# Patient Record
Sex: MALE | Race: WHITE | URBAN - METROPOLITAN AREA
[De-identification: names, ages, dates, MRNs, and addresses within clinical notes are randomized per-mention and may not be internally consistent; named-entity substitution may affect disease eponyms.]

---

## 2018-12-23 ENCOUNTER — HOSPITAL ENCOUNTER (INPATIENT)
Facility: CLINIC | Age: 38
LOS: 3 days | Discharge: HOME | DRG: 580 | End: 2018-12-27
Attending: PHYSICIAN ASSISTANT | Admitting: FAMILY MEDICINE
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
## 2018-12-23 DIAGNOSIS — S61.411A STAB WOUND OF RIGHT HAND, INITIAL ENCOUNTER: ICD-10-CM

## 2018-12-23 DIAGNOSIS — L03.114 CELLULITIS OF LEFT UPPER EXTREMITY: ICD-10-CM

## 2018-12-23 DIAGNOSIS — L03.114 CELLULITIS OF LEFT UPPER EXTREMITY: Primary | ICD-10-CM

## 2018-12-23 DIAGNOSIS — L03.113 CELLULITIS OF RIGHT HAND: ICD-10-CM

## 2018-12-23 DIAGNOSIS — R79.82 ELEVATED C-REACTIVE PROTEIN (CRP): ICD-10-CM

## 2018-12-23 PROBLEM — L08.9 SKIN INFECTION: Status: ACTIVE | Noted: 2018-12-23

## 2018-12-23 PROBLEM — F17.200 CURRENT SMOKER: Status: ACTIVE | Noted: 2018-12-23

## 2018-12-23 LAB
ANION GAP SERPL CALCULATED.3IONS-SCNC: 11 MMOL/L (ref 3–14)
BASOPHILS # BLD AUTO: 0 10E9/L (ref 0–0.2)
BASOPHILS NFR BLD AUTO: 0.3 %
BUN SERPL-MCNC: 17 MG/DL (ref 7–30)
CALCIUM SERPL-MCNC: 8.3 MG/DL (ref 8.5–10.1)
CHLORIDE SERPL-SCNC: 103 MMOL/L (ref 94–109)
CO2 SERPL-SCNC: 25 MMOL/L (ref 20–32)
CREAT SERPL-MCNC: 1.18 MG/DL (ref 0.66–1.25)
CRP SERPL-MCNC: 60 MG/L (ref 0–8)
DIFFERENTIAL METHOD BLD: NORMAL
EOSINOPHIL NFR BLD AUTO: 0.6 %
ERYTHROCYTE [DISTWIDTH] IN BLOOD BY AUTOMATED COUNT: 11.9 % (ref 10–15)
GFR SERPL CREATININE-BSD FRML MDRD: 77 ML/MIN/{1.73_M2}
GLUCOSE SERPL-MCNC: 88 MG/DL (ref 70–99)
HCT VFR BLD AUTO: 42.6 % (ref 40–53)
HGB BLD-MCNC: 15 G/DL (ref 13.3–17.7)
IMM GRANULOCYTES # BLD: 0 10E9/L (ref 0–0.4)
IMM GRANULOCYTES NFR BLD: 0.4 %
LACTATE BLD-SCNC: 0.8 MMOL/L (ref 0.7–2)
LYMPHOCYTES # BLD AUTO: 2.1 10E9/L (ref 0.8–5.3)
LYMPHOCYTES NFR BLD AUTO: 21.8 %
MCH RBC QN AUTO: 30.4 PG (ref 26.5–33)
MCHC RBC AUTO-ENTMCNC: 35.2 G/DL (ref 31.5–36.5)
MCV RBC AUTO: 86 FL (ref 78–100)
MONOCYTES # BLD AUTO: 1.1 10E9/L (ref 0–1.3)
MONOCYTES NFR BLD AUTO: 11.6 %
NEUTROPHILS # BLD AUTO: 6.2 10E9/L (ref 1.6–8.3)
NEUTROPHILS NFR BLD AUTO: 65.3 %
NRBC # BLD AUTO: 0 10*3/UL
NRBC BLD AUTO-RTO: 0 /100
PLATELET # BLD AUTO: 217 10E9/L (ref 150–450)
POTASSIUM SERPL-SCNC: 3.9 MMOL/L (ref 3.4–5.3)
RBC # BLD AUTO: 4.93 10E12/L (ref 4.4–5.9)
SODIUM SERPL-SCNC: 139 MMOL/L (ref 133–144)
WBC # BLD AUTO: 9.5 10E9/L (ref 4–11)

## 2018-12-23 PROCEDURE — 96375 TX/PRO/DX INJ NEW DRUG ADDON: CPT | Performed by: FAMILY MEDICINE

## 2018-12-23 PROCEDURE — 99285 EMERGENCY DEPT VISIT HI MDM: CPT | Mod: Z6 | Performed by: FAMILY MEDICINE

## 2018-12-23 PROCEDURE — G0378 HOSPITAL OBSERVATION PER HR: HCPCS

## 2018-12-23 PROCEDURE — 99285 EMERGENCY DEPT VISIT HI MDM: CPT | Mod: 25 | Performed by: FAMILY MEDICINE

## 2018-12-23 PROCEDURE — 90471 IMMUNIZATION ADMIN: CPT | Performed by: FAMILY MEDICINE

## 2018-12-23 PROCEDURE — 87040 BLOOD CULTURE FOR BACTERIA: CPT | Performed by: PHYSICIAN ASSISTANT

## 2018-12-23 PROCEDURE — 25000128 H RX IP 250 OP 636: Performed by: PHYSICIAN ASSISTANT

## 2018-12-23 PROCEDURE — 99219 ZZC INITIAL OBSERVATION CARE,LEVL II: CPT | Mod: AI | Performed by: FAMILY MEDICINE

## 2018-12-23 PROCEDURE — 96365 THER/PROPH/DIAG IV INF INIT: CPT | Performed by: FAMILY MEDICINE

## 2018-12-23 PROCEDURE — 80048 BASIC METABOLIC PNL TOTAL CA: CPT | Performed by: PHYSICIAN ASSISTANT

## 2018-12-23 PROCEDURE — 86140 C-REACTIVE PROTEIN: CPT | Performed by: PHYSICIAN ASSISTANT

## 2018-12-23 PROCEDURE — 90715 TDAP VACCINE 7 YRS/> IM: CPT | Performed by: PHYSICIAN ASSISTANT

## 2018-12-23 PROCEDURE — 83605 ASSAY OF LACTIC ACID: CPT | Performed by: PHYSICIAN ASSISTANT

## 2018-12-23 PROCEDURE — 85025 COMPLETE CBC W/AUTO DIFF WBC: CPT | Performed by: PHYSICIAN ASSISTANT

## 2018-12-23 PROCEDURE — 25000125 ZZHC RX 250: Performed by: FAMILY MEDICINE

## 2018-12-23 PROCEDURE — 25000132 ZZH RX MED GY IP 250 OP 250 PS 637: Performed by: FAMILY MEDICINE

## 2018-12-23 RX ORDER — CEFAZOLIN SODIUM 1 G/50ML
1250 SOLUTION INTRAVENOUS EVERY 8 HOURS
Status: DISCONTINUED | OUTPATIENT
Start: 2018-12-23 | End: 2018-12-24

## 2018-12-23 RX ORDER — LIDOCAINE 40 MG/G
CREAM TOPICAL
Status: DISCONTINUED | OUTPATIENT
Start: 2018-12-23 | End: 2018-12-24

## 2018-12-23 RX ORDER — ERTAPENEM 1 G/1
1 INJECTION, POWDER, LYOPHILIZED, FOR SOLUTION INTRAMUSCULAR; INTRAVENOUS EVERY 24 HOURS
Status: DISCONTINUED | OUTPATIENT
Start: 2018-12-24 | End: 2018-12-24

## 2018-12-23 RX ORDER — ACETAMINOPHEN 650 MG/1
650 SUPPOSITORY RECTAL EVERY 4 HOURS PRN
Status: DISCONTINUED | OUTPATIENT
Start: 2018-12-23 | End: 2018-12-24

## 2018-12-23 RX ORDER — ERTAPENEM 1 G/1
1 INJECTION, POWDER, LYOPHILIZED, FOR SOLUTION INTRAMUSCULAR; INTRAVENOUS ONCE
Status: COMPLETED | OUTPATIENT
Start: 2018-12-23 | End: 2018-12-24

## 2018-12-23 RX ORDER — KETOROLAC TROMETHAMINE 30 MG/ML
30 INJECTION, SOLUTION INTRAMUSCULAR; INTRAVENOUS ONCE
Status: COMPLETED | OUTPATIENT
Start: 2018-12-23 | End: 2018-12-23

## 2018-12-23 RX ORDER — NALOXONE HYDROCHLORIDE 0.4 MG/ML
.1-.4 INJECTION, SOLUTION INTRAMUSCULAR; INTRAVENOUS; SUBCUTANEOUS
Status: DISCONTINUED | OUTPATIENT
Start: 2018-12-23 | End: 2018-12-24

## 2018-12-23 RX ORDER — ONDANSETRON 2 MG/ML
4 INJECTION INTRAMUSCULAR; INTRAVENOUS EVERY 6 HOURS PRN
Status: DISCONTINUED | OUTPATIENT
Start: 2018-12-23 | End: 2018-12-27 | Stop reason: HOSPADM

## 2018-12-23 RX ORDER — ACETAMINOPHEN 325 MG/1
650 TABLET ORAL EVERY 4 HOURS PRN
Status: DISCONTINUED | OUTPATIENT
Start: 2018-12-23 | End: 2018-12-24

## 2018-12-23 RX ORDER — OXYCODONE HYDROCHLORIDE 5 MG/1
5-10 TABLET ORAL EVERY 4 HOURS PRN
Status: DISCONTINUED | OUTPATIENT
Start: 2018-12-23 | End: 2018-12-24

## 2018-12-23 RX ORDER — ONDANSETRON 4 MG/1
4 TABLET, ORALLY DISINTEGRATING ORAL EVERY 6 HOURS PRN
Status: DISCONTINUED | OUTPATIENT
Start: 2018-12-23 | End: 2018-12-27 | Stop reason: HOSPADM

## 2018-12-23 RX ORDER — BACITRACIN ZINC 500 [USP'U]/G
OINTMENT TOPICAL DAILY
Status: DISCONTINUED | OUTPATIENT
Start: 2018-12-23 | End: 2018-12-24

## 2018-12-23 RX ADMIN — KETOROLAC TROMETHAMINE 30 MG: 30 INJECTION, SOLUTION INTRAMUSCULAR at 20:27

## 2018-12-23 RX ADMIN — VANCOMYCIN HYDROCHLORIDE 1250 MG: 1 INJECTION, POWDER, LYOPHILIZED, FOR SOLUTION INTRAVENOUS at 22:12

## 2018-12-23 RX ADMIN — CLOSTRIDIUM TETANI TOXOID ANTIGEN (FORMALDEHYDE INACTIVATED), CORYNEBACTERIUM DIPHTHERIAE TOXOID ANTIGEN (FORMALDEHYDE INACTIVATED), BORDETELLA PERTUSSIS TOXOID ANTIGEN (GLUTARALDEHYDE INACTIVATED), BORDETELLA PERTUSSIS FILAMENTOUS HEMAGGLUTININ ANTIGEN (FORMALDEHYDE INACTIVATED), BORDETELLA PERTUSSIS PERTACTIN ANTIGEN, AND BORDETELLA PERTUSSIS FIMBRIAE 2/3 ANTIGEN 0.5 ML: 5; 2; 2.5; 5; 3; 5 INJECTION, SUSPENSION INTRAMUSCULAR at 22:17

## 2018-12-23 RX ADMIN — OXYCODONE HYDROCHLORIDE 10 MG: 5 TABLET ORAL at 23:34

## 2018-12-23 RX ADMIN — BACITRACIN: 500 OINTMENT TOPICAL at 23:42

## 2018-12-23 ASSESSMENT — MIFFLIN-ST. JEOR
SCORE: 1974.25
SCORE: 1878.8

## 2018-12-24 ENCOUNTER — ANESTHESIA EVENT (OUTPATIENT)
Dept: SURGERY | Facility: CLINIC | Age: 38
DRG: 580 | End: 2018-12-24

## 2018-12-24 ENCOUNTER — ANESTHESIA (OUTPATIENT)
Dept: SURGERY | Facility: CLINIC | Age: 38
DRG: 580 | End: 2018-12-24

## 2018-12-24 ENCOUNTER — APPOINTMENT (OUTPATIENT)
Dept: GENERAL RADIOLOGY | Facility: CLINIC | Age: 38
DRG: 580 | End: 2018-12-24
Attending: ORTHOPAEDIC SURGERY

## 2018-12-24 PROBLEM — M00.9: Status: ACTIVE | Noted: 2018-12-24

## 2018-12-24 LAB
ANION GAP SERPL CALCULATED.3IONS-SCNC: 9 MMOL/L (ref 3–14)
BUN SERPL-MCNC: 17 MG/DL (ref 7–30)
CALCIUM SERPL-MCNC: 7.9 MG/DL (ref 8.5–10.1)
CHLORIDE SERPL-SCNC: 105 MMOL/L (ref 94–109)
CO2 SERPL-SCNC: 24 MMOL/L (ref 20–32)
CREAT SERPL-MCNC: 1.19 MG/DL (ref 0.66–1.25)
GFR SERPL CREATININE-BSD FRML MDRD: 77 ML/MIN/{1.73_M2}
GLUCOSE SERPL-MCNC: 96 MG/DL (ref 70–99)
GRAM STN SPEC: ABNORMAL
Lab: ABNORMAL
POTASSIUM SERPL-SCNC: 3.6 MMOL/L (ref 3.4–5.3)
SODIUM SERPL-SCNC: 138 MMOL/L (ref 133–144)
SPECIMEN SOURCE: ABNORMAL

## 2018-12-24 PROCEDURE — 87075 CULTR BACTERIA EXCEPT BLOOD: CPT | Performed by: ORTHOPAEDIC SURGERY

## 2018-12-24 PROCEDURE — 96375 TX/PRO/DX INJ NEW DRUG ADDON: CPT

## 2018-12-24 PROCEDURE — 87070 CULTURE OTHR SPECIMN AEROBIC: CPT | Performed by: ORTHOPAEDIC SURGERY

## 2018-12-24 PROCEDURE — 73130 X-RAY EXAM OF HAND: CPT | Mod: TC,RT

## 2018-12-24 PROCEDURE — 25000128 H RX IP 250 OP 636: Performed by: NURSE ANESTHETIST, CERTIFIED REGISTERED

## 2018-12-24 PROCEDURE — 0JBJ0ZZ EXCISION OF RIGHT HAND SUBCUTANEOUS TISSUE AND FASCIA, OPEN APPROACH: ICD-10-PCS | Performed by: ORTHOPAEDIC SURGERY

## 2018-12-24 PROCEDURE — 25000132 ZZH RX MED GY IP 250 OP 250 PS 637: Performed by: ORTHOPAEDIC SURGERY

## 2018-12-24 PROCEDURE — 12000000 ZZH R&B MED SURG/OB

## 2018-12-24 PROCEDURE — 25000128 H RX IP 250 OP 636: Performed by: FAMILY MEDICINE

## 2018-12-24 PROCEDURE — 87205 SMEAR GRAM STAIN: CPT | Performed by: ORTHOPAEDIC SURGERY

## 2018-12-24 PROCEDURE — 25000128 H RX IP 250 OP 636: Performed by: ORTHOPAEDIC SURGERY

## 2018-12-24 PROCEDURE — 87077 CULTURE AEROBIC IDENTIFY: CPT | Performed by: ORTHOPAEDIC SURGERY

## 2018-12-24 PROCEDURE — 36415 COLL VENOUS BLD VENIPUNCTURE: CPT | Performed by: FAMILY MEDICINE

## 2018-12-24 PROCEDURE — 27210794 ZZH OR GENERAL SUPPLY STERILE: Performed by: ORTHOPAEDIC SURGERY

## 2018-12-24 PROCEDURE — 96376 TX/PRO/DX INJ SAME DRUG ADON: CPT

## 2018-12-24 PROCEDURE — 25000125 ZZHC RX 250: Performed by: NURSE ANESTHETIST, CERTIFIED REGISTERED

## 2018-12-24 PROCEDURE — 87076 CULTURE ANAEROBE IDENT EACH: CPT | Performed by: ORTHOPAEDIC SURGERY

## 2018-12-24 PROCEDURE — 36000058 ZZH SURGERY LEVEL 3 EA 15 ADDTL MIN: Performed by: ORTHOPAEDIC SURGERY

## 2018-12-24 PROCEDURE — 37000009 ZZH ANESTHESIA TECHNICAL FEE, EACH ADDTL 15 MIN: Performed by: ORTHOPAEDIC SURGERY

## 2018-12-24 PROCEDURE — 87186 SC STD MICRODIL/AGAR DIL: CPT | Performed by: ORTHOPAEDIC SURGERY

## 2018-12-24 PROCEDURE — 40000306 ZZH STATISTIC PRE PROC ASSESS II: Performed by: ORTHOPAEDIC SURGERY

## 2018-12-24 PROCEDURE — 26075 EXPLORE/TREAT FINGER JOINT: CPT | Mod: F9 | Performed by: ORTHOPAEDIC SURGERY

## 2018-12-24 PROCEDURE — 71000014 ZZH RECOVERY PHASE 1 LEVEL 2 FIRST HR: Performed by: ORTHOPAEDIC SURGERY

## 2018-12-24 PROCEDURE — 99207 ZZC NON-BILLABLE SERV PER CHARTING: CPT | Performed by: FAMILY MEDICINE

## 2018-12-24 PROCEDURE — 25000128 H RX IP 250 OP 636: Performed by: PHYSICIAN ASSISTANT

## 2018-12-24 PROCEDURE — 25000132 ZZH RX MED GY IP 250 OP 250 PS 637: Performed by: FAMILY MEDICINE

## 2018-12-24 PROCEDURE — G0378 HOSPITAL OBSERVATION PER HR: HCPCS

## 2018-12-24 PROCEDURE — 80048 BASIC METABOLIC PNL TOTAL CA: CPT | Performed by: FAMILY MEDICINE

## 2018-12-24 PROCEDURE — 37000008 ZZH ANESTHESIA TECHNICAL FEE, 1ST 30 MIN: Performed by: ORTHOPAEDIC SURGERY

## 2018-12-24 PROCEDURE — 87185 SC STD ENZYME DETCJ PER NZM: CPT | Performed by: ORTHOPAEDIC SURGERY

## 2018-12-24 PROCEDURE — 25000566 ZZH SEVOFLURANE, EA 15 MIN: Performed by: ORTHOPAEDIC SURGERY

## 2018-12-24 PROCEDURE — 36000056 ZZH SURGERY LEVEL 3 1ST 30 MIN: Performed by: ORTHOPAEDIC SURGERY

## 2018-12-24 RX ORDER — COVID-19 ANTIGEN TEST
440 KIT MISCELLANEOUS PRN
COMMUNITY

## 2018-12-24 RX ORDER — FENTANYL CITRATE 50 UG/ML
25-50 INJECTION, SOLUTION INTRAMUSCULAR; INTRAVENOUS
Status: DISCONTINUED | OUTPATIENT
Start: 2018-12-24 | End: 2018-12-25

## 2018-12-24 RX ORDER — LIDOCAINE 40 MG/G
CREAM TOPICAL
Status: DISCONTINUED | OUTPATIENT
Start: 2018-12-24 | End: 2018-12-27 | Stop reason: HOSPADM

## 2018-12-24 RX ORDER — OXYCODONE HYDROCHLORIDE 5 MG/1
5-10 TABLET ORAL
Status: DISCONTINUED | OUTPATIENT
Start: 2018-12-24 | End: 2018-12-27 | Stop reason: HOSPADM

## 2018-12-24 RX ORDER — HYDROMORPHONE HYDROCHLORIDE 1 MG/ML
.3-.5 INJECTION, SOLUTION INTRAMUSCULAR; INTRAVENOUS; SUBCUTANEOUS EVERY 10 MIN PRN
Status: DISCONTINUED | OUTPATIENT
Start: 2018-12-24 | End: 2018-12-24 | Stop reason: HOSPADM

## 2018-12-24 RX ORDER — DIMENHYDRINATE 50 MG/ML
25 INJECTION, SOLUTION INTRAMUSCULAR; INTRAVENOUS
Status: DISCONTINUED | OUTPATIENT
Start: 2018-12-24 | End: 2018-12-24 | Stop reason: HOSPADM

## 2018-12-24 RX ORDER — PROPOFOL 10 MG/ML
INJECTION, EMULSION INTRAVENOUS PRN
Status: DISCONTINUED | OUTPATIENT
Start: 2018-12-24 | End: 2018-12-24

## 2018-12-24 RX ORDER — FENTANYL CITRATE 50 UG/ML
INJECTION, SOLUTION INTRAMUSCULAR; INTRAVENOUS PRN
Status: DISCONTINUED | OUTPATIENT
Start: 2018-12-24 | End: 2018-12-24

## 2018-12-24 RX ORDER — SODIUM CHLORIDE, SODIUM LACTATE, POTASSIUM CHLORIDE, CALCIUM CHLORIDE 600; 310; 30; 20 MG/100ML; MG/100ML; MG/100ML; MG/100ML
INJECTION, SOLUTION INTRAVENOUS CONTINUOUS
Status: DISCONTINUED | OUTPATIENT
Start: 2018-12-24 | End: 2018-12-24 | Stop reason: HOSPADM

## 2018-12-24 RX ORDER — FENTANYL CITRATE 50 UG/ML
25-50 INJECTION, SOLUTION INTRAMUSCULAR; INTRAVENOUS
Status: DISCONTINUED | OUTPATIENT
Start: 2018-12-24 | End: 2018-12-24 | Stop reason: HOSPADM

## 2018-12-24 RX ORDER — NALOXONE HYDROCHLORIDE 0.4 MG/ML
.1-.4 INJECTION, SOLUTION INTRAMUSCULAR; INTRAVENOUS; SUBCUTANEOUS
Status: DISCONTINUED | OUTPATIENT
Start: 2018-12-24 | End: 2018-12-27 | Stop reason: HOSPADM

## 2018-12-24 RX ORDER — AMPICILLIN AND SULBACTAM 2; 1 G/1; G/1
3 INJECTION, POWDER, FOR SOLUTION INTRAMUSCULAR; INTRAVENOUS EVERY 6 HOURS
Status: DISCONTINUED | OUTPATIENT
Start: 2018-12-24 | End: 2018-12-27 | Stop reason: HOSPADM

## 2018-12-24 RX ORDER — ACETAMINOPHEN 325 MG/1
975 TABLET ORAL EVERY 8 HOURS
Status: COMPLETED | OUTPATIENT
Start: 2018-12-24 | End: 2018-12-27

## 2018-12-24 RX ORDER — ONDANSETRON 2 MG/ML
4 INJECTION INTRAMUSCULAR; INTRAVENOUS EVERY 30 MIN PRN
Status: DISCONTINUED | OUTPATIENT
Start: 2018-12-24 | End: 2018-12-24 | Stop reason: HOSPADM

## 2018-12-24 RX ORDER — MEPERIDINE HYDROCHLORIDE 25 MG/ML
12.5 INJECTION INTRAMUSCULAR; INTRAVENOUS; SUBCUTANEOUS
Status: DISCONTINUED | OUTPATIENT
Start: 2018-12-24 | End: 2018-12-24 | Stop reason: HOSPADM

## 2018-12-24 RX ORDER — SODIUM CHLORIDE, SODIUM LACTATE, POTASSIUM CHLORIDE, CALCIUM CHLORIDE 600; 310; 30; 20 MG/100ML; MG/100ML; MG/100ML; MG/100ML
INJECTION, SOLUTION INTRAVENOUS CONTINUOUS
Status: DISCONTINUED | OUTPATIENT
Start: 2018-12-24 | End: 2018-12-27 | Stop reason: HOSPADM

## 2018-12-24 RX ORDER — LIDOCAINE HYDROCHLORIDE 20 MG/ML
INJECTION, SOLUTION INFILTRATION; PERINEURAL PRN
Status: DISCONTINUED | OUTPATIENT
Start: 2018-12-24 | End: 2018-12-24

## 2018-12-24 RX ORDER — LIDOCAINE 40 MG/G
CREAM TOPICAL
Status: DISCONTINUED | OUTPATIENT
Start: 2018-12-24 | End: 2018-12-24 | Stop reason: HOSPADM

## 2018-12-24 RX ORDER — ONDANSETRON 4 MG/1
4 TABLET, ORALLY DISINTEGRATING ORAL EVERY 6 HOURS PRN
Status: DISCONTINUED | OUTPATIENT
Start: 2018-12-24 | End: 2018-12-24

## 2018-12-24 RX ORDER — ONDANSETRON 2 MG/ML
4 INJECTION INTRAMUSCULAR; INTRAVENOUS EVERY 6 HOURS PRN
Status: DISCONTINUED | OUTPATIENT
Start: 2018-12-24 | End: 2018-12-24

## 2018-12-24 RX ORDER — PROCHLORPERAZINE MALEATE 5 MG
10 TABLET ORAL EVERY 6 HOURS PRN
Status: DISCONTINUED | OUTPATIENT
Start: 2018-12-24 | End: 2018-12-27 | Stop reason: HOSPADM

## 2018-12-24 RX ORDER — HYDROMORPHONE HYDROCHLORIDE 1 MG/ML
.3-.5 INJECTION, SOLUTION INTRAMUSCULAR; INTRAVENOUS; SUBCUTANEOUS
Status: DISCONTINUED | OUTPATIENT
Start: 2018-12-24 | End: 2018-12-27 | Stop reason: HOSPADM

## 2018-12-24 RX ORDER — NALOXONE HYDROCHLORIDE 0.4 MG/ML
.1-.4 INJECTION, SOLUTION INTRAMUSCULAR; INTRAVENOUS; SUBCUTANEOUS
Status: DISCONTINUED | OUTPATIENT
Start: 2018-12-24 | End: 2018-12-24 | Stop reason: HOSPADM

## 2018-12-24 RX ORDER — KETOROLAC TROMETHAMINE 30 MG/ML
INJECTION, SOLUTION INTRAMUSCULAR; INTRAVENOUS PRN
Status: DISCONTINUED | OUTPATIENT
Start: 2018-12-24 | End: 2018-12-24

## 2018-12-24 RX ORDER — ONDANSETRON 4 MG/1
4 TABLET, ORALLY DISINTEGRATING ORAL EVERY 30 MIN PRN
Status: DISCONTINUED | OUTPATIENT
Start: 2018-12-24 | End: 2018-12-24 | Stop reason: HOSPADM

## 2018-12-24 RX ORDER — DOCUSATE SODIUM 100 MG/1
100 CAPSULE, LIQUID FILLED ORAL 2 TIMES DAILY
Status: DISCONTINUED | OUTPATIENT
Start: 2018-12-24 | End: 2018-12-27

## 2018-12-24 RX ORDER — DEXAMETHASONE SODIUM PHOSPHATE 4 MG/ML
INJECTION, SOLUTION INTRA-ARTICULAR; INTRALESIONAL; INTRAMUSCULAR; INTRAVENOUS; SOFT TISSUE PRN
Status: DISCONTINUED | OUTPATIENT
Start: 2018-12-24 | End: 2018-12-24

## 2018-12-24 RX ADMIN — FENTANYL CITRATE 25 MCG: 50 INJECTION, SOLUTION INTRAMUSCULAR; INTRAVENOUS at 12:18

## 2018-12-24 RX ADMIN — FENTANYL CITRATE 50 MCG: 50 INJECTION INTRAMUSCULAR; INTRAVENOUS at 12:58

## 2018-12-24 RX ADMIN — ACETAMINOPHEN 975 MG: 325 TABLET ORAL at 21:45

## 2018-12-24 RX ADMIN — OXYCODONE HYDROCHLORIDE 10 MG: 5 TABLET ORAL at 21:45

## 2018-12-24 RX ADMIN — AMPICILLIN SODIUM AND SULBACTAM SODIUM 3 G: 2; 1 INJECTION, POWDER, FOR SOLUTION INTRAMUSCULAR; INTRAVENOUS at 23:10

## 2018-12-24 RX ADMIN — VANCOMYCIN HYDROCHLORIDE 1250 MG: 1 INJECTION, POWDER, LYOPHILIZED, FOR SOLUTION INTRAVENOUS at 05:35

## 2018-12-24 RX ADMIN — ONDANSETRON 4 MG: 2 INJECTION INTRAMUSCULAR; INTRAVENOUS at 11:51

## 2018-12-24 RX ADMIN — SODIUM CHLORIDE, POTASSIUM CHLORIDE, SODIUM LACTATE AND CALCIUM CHLORIDE: 600; 310; 30; 20 INJECTION, SOLUTION INTRAVENOUS at 14:28

## 2018-12-24 RX ADMIN — PROPOFOL 200 MG: 10 INJECTION, EMULSION INTRAVENOUS at 11:48

## 2018-12-24 RX ADMIN — OXYCODONE HYDROCHLORIDE 10 MG: 5 TABLET ORAL at 17:33

## 2018-12-24 RX ADMIN — SODIUM CHLORIDE, POTASSIUM CHLORIDE, SODIUM LACTATE AND CALCIUM CHLORIDE: 600; 310; 30; 20 INJECTION, SOLUTION INTRAVENOUS at 11:44

## 2018-12-24 RX ADMIN — Medication 0.5 MG: at 12:56

## 2018-12-24 RX ADMIN — OXYCODONE HYDROCHLORIDE 10 MG: 5 TABLET ORAL at 13:18

## 2018-12-24 RX ADMIN — KETOROLAC TROMETHAMINE 30 MG: 30 INJECTION, SOLUTION INTRAMUSCULAR at 12:23

## 2018-12-24 RX ADMIN — DEXAMETHASONE SODIUM PHOSPHATE 4 MG: 4 INJECTION, SOLUTION INTRAMUSCULAR; INTRAVENOUS at 11:51

## 2018-12-24 RX ADMIN — OXYCODONE HYDROCHLORIDE 10 MG: 5 TABLET ORAL at 05:45

## 2018-12-24 RX ADMIN — FENTANYL CITRATE 25 MCG: 50 INJECTION, SOLUTION INTRAMUSCULAR; INTRAVENOUS at 12:06

## 2018-12-24 RX ADMIN — ERTAPENEM SODIUM 1 G: 1 INJECTION, POWDER, LYOPHILIZED, FOR SOLUTION INTRAMUSCULAR; INTRAVENOUS at 01:07

## 2018-12-24 RX ADMIN — AMPICILLIN SODIUM AND SULBACTAM SODIUM 3 G: 2; 1 INJECTION, POWDER, FOR SOLUTION INTRAMUSCULAR; INTRAVENOUS at 16:15

## 2018-12-24 RX ADMIN — FENTANYL CITRATE 50 MCG: 50 INJECTION, SOLUTION INTRAMUSCULAR; INTRAVENOUS at 11:48

## 2018-12-24 RX ADMIN — ACETAMINOPHEN 975 MG: 325 TABLET ORAL at 14:27

## 2018-12-24 RX ADMIN — MIDAZOLAM 2 MG: 1 INJECTION INTRAMUSCULAR; INTRAVENOUS at 11:44

## 2018-12-24 RX ADMIN — LIDOCAINE HYDROCHLORIDE 60 MG: 20 INJECTION, SOLUTION INFILTRATION; PERINEURAL at 11:48

## 2018-12-24 RX ADMIN — FENTANYL CITRATE 50 MCG: 50 INJECTION INTRAMUSCULAR; INTRAVENOUS at 13:05

## 2018-12-24 RX ADMIN — DOCUSATE SODIUM 100 MG: 100 CAPSULE, LIQUID FILLED ORAL at 21:45

## 2018-12-24 ASSESSMENT — ACTIVITIES OF DAILY LIVING (ADL)
DRESS: 0-->INDEPENDENT
SWALLOWING: 0-->SWALLOWS FOODS/LIQUIDS WITHOUT DIFFICULTY
BATHING: 0-->INDEPENDENT
COGNITION: 0 - NO COGNITION ISSUES REPORTED
RETIRED_COMMUNICATION: 0-->UNDERSTANDS/COMMUNICATES WITHOUT DIFFICULTY
ADLS_ACUITY_SCORE: 10
RETIRED_EATING: 0-->INDEPENDENT
AMBULATION: 0-->INDEPENDENT
WHICH_OF_THE_ABOVE_FUNCTIONAL_RISKS_HAD_A_RECENT_ONSET_OR_CHANGE?: FALL HISTORY
TRANSFERRING: 0-->INDEPENDENT
ADLS_ACUITY_SCORE: 10
TOILETING: 0-->INDEPENDENT

## 2018-12-24 ASSESSMENT — LIFESTYLE VARIABLES: TOBACCO_USE: 1
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
## 2018-12-24 NOTE — PROGRESS NOTES
S-(situation): Patient changed to inpatient status    B-(background): Patient status change due to observation goals not being met.     A-(assessment): Pt transferred from PACU, s/p I&D of right hand. See VS f/s.  Right hand dressing C/D/I, elevated on pillows, ice applied. Pt reports no pain. No numbness or tingling. Tolerating clear liquids with oral meds.     R-(recommendations):   Will monitor patient per MD orders.       Inpatient nursing criteria listed below were met:    Adult Profile completedYes  Health care directives status obtained and documented: Yes  VTE prophylaxis orders: SCD  (FYI: Asprin is not an approved anticoagulation for DVT prophylaxis)  SCD's Documented: Yes  Vaccine assessment done and vaccine ordered if needed. Yes  MRSA swab completed for patient 55 years and older (exclude BRITNI and TKA): NA  My Chart patient sign up addressed and documented: Yes  Care Plan initiated: Yes  Discharge planning review completed (admission navigator) Yes

## 2018-12-24 NOTE — PROGRESS NOTES
S-(situation): Patient registered to Observation. Patient arrived to room 253 via cart from ED    B-(background): Right hand laceration and cellulitis    A-(assessment): Pt reporting moderate amount of pain in right hand. Scant amount of serosanginous drainage present from wound. Pt glued wound closed last night. Area cleansed well with saline, bacitracin applied and new dressing applied. Redness extends from fingers to slightly above wrist area. Pt denies any numbness or tingling and is able to move fingers. Vanco infusing upon arrival.     R-(recommendations): Orders and observation goals reviewed with patient
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
## 2018-12-24 NOTE — PROGRESS NOTES
Pre op addendum to Dr Villar's note.    Based on exam done by Dr Villar (see H and P) and discussion with Dr Villar, the patient is cleared for orthopedic procedure today for Rt hand cellulitis.    Patrick Gonsales MD.
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
## 2018-12-24 NOTE — PHARMACY-VANCOMYCIN DOSING SERVICE
Pharmacy Vancomycin Initial Note  Date of Service 2018  Patient's  1980  38 year old, male    Indication: Sepsis    Current estimated CrCl = Estimated Creatinine Clearance: 98.3 mL/min (based on SCr of 1.18 mg/dL).    Creatinine for last 3 days  2018:  8:05 PM Creatinine 1.18 mg/dL    Recent Vancomycin Level(s) for last 3 days  No results found for requested labs within last 72 hours.      Vancomycin IV Administrations (past 72 hours)      No vancomycin orders with administrations in past 72 hours.                Nephrotoxins and other renal medications (From now, onward)    Start     Dose/Rate Route Frequency Ordered Stop    18  vancomycin (VANCOCIN) 1,250 mg in sodium chloride 0.9 % 250 mL intermittent infusion      1,250 mg  over 90 Minutes Intravenous EVERY 8 HOURS 18            Contrast Orders - past 72 hours (72h ago, onward)    None                Plan:  1.  Start vancomycin  1250 mg IV q8h.   2.  Goal Trough Level: 15-20 mg/L   3.  Pharmacy will check trough levels as appropriate in 1-3 Days.    4. Serum creatinine levels will be ordered daily for the first week of therapy and at least twice weekly for subsequent weeks.    5. [place] method utilized to dose vancomycin therapy: Method 1    Edgar Sánchez

## 2018-12-24 NOTE — PROGRESS NOTES
SPIRITUAL HEALTH SERVICES  SPIRITUAL ASSESSMENT Progress Note  [facility]      During Rounding,  introduced himself to Cristopher Shaikh and informed him of his availability.    Jono Barrios M.Div., [facility]  Staff   Office tel: [phone]

## 2018-12-24 NOTE — ANESTHESIA POSTPROCEDURE EVALUATION
Patient: Cristopher Shaikh IV    Procedure(s):  COMBINED IRRIGATION AND DEBRIDEMENT HAND    Diagnosis:Right Hand Infection  Diagnosis Additional Information: No value filed.    Anesthesia Type:  General    Note:  Anesthesia Post Evaluation    Patient location during evaluation: Bedside and Floor  Patient participation: Able to fully participate in evaluation  Level of consciousness: awake  Pain management: adequate  Airway patency: patent  Cardiovascular status: acceptable and hemodynamically stable  Respiratory status: acceptable, room air and nonlabored ventilation  Hydration status: stable  PONV: none     Anesthetic complications: None    Comments: Patient was happy with the anesthesia care received and no anesthesia related complications were noted.  I will follow up with the patient again if it is needed.        Last vitals:  Vitals:    12/24/18 1408 12/24/18 1454 12/24/18 1525   BP: 128/78 129/79 124/81   Pulse: 92 85 81   Resp: 16 20    Temp:   97.6  F (36.4  C)   SpO2: 96% 96% 97%         Electronically Signed By: IDANIA Geronimo CRNA  December 24, 2018  3:50 PM

## 2018-12-24 NOTE — PLAN OF CARE
"S-(situation): End of shift note    B-(background): Cellulitis    A-(assessment): /88 (BP Location: Left arm)   Pulse 81   Temp 98.7  F (37.1  C) (Oral)   Resp 16   Ht 1.778 m (5' 10\")   Wt 104.8 kg (231 lb 0.7 oz)   SpO2 96%   BMI 33.15 kg/m  . Vital signs stable.  Afebrile. Lung sounds CTA.  A&O.  Pain controlled with oxycodone x1. Slept well through night and declined pain meds at 0415. Bandage is CDI with no drainage. Pt is able to verbalize needs.     R-(recommendations): Continue to monitor per care plan    "

## 2018-12-24 NOTE — PROGRESS NOTES
S- Transfer to OR from MS.    B- Pt has hand cellulitis going to OR for I&D    A- Brief systems assessment: Is A&O.  VSS.  Afebrile.  Having right hand pain.  Denies pain at this time.      R- Transfer to OR per physician orders. Continue to monitor pt and update physician as needed.      Code status: Full Code  Skin: Right hand wound - no other problems  Fall Risk: Yes- Department fall risk interventions implemented.  Isolation: None  Core Measures:   No  Medication drips upon transfer: IV fluids

## 2018-12-24 NOTE — OR NURSING
Transfer from  PACU to Room MED/SURG  Transferred to bed via AMBULATORY (Glyder Mat,Transfer Boar,Slider Sheet)    S: 39 y/o MALE  S/P CoCombined Irrigation And Debridement Hand-Right Right Hand Irrigation and Debridement Pulse Lavagmbined Irrigation And Debridement Hand-Right Right Hand Irrigation and Debridement Pulse Lavage       Anesthesia Type:  GEN       Surgeon:  Dr. CROCKER       Allergies:  See Medication Reconciliation Record       DNR: SEE EHR  (Yes,No)    B:  Pertinent Medical History: SEE EHR   (CHF; Heart Disease; Lung Disease; Chronic Pain; Diabetes; Other (Comment)          Surgical History:  SEE EHR    A:  EBL: 20        IVF:  1400        UOP:  0        NPO:  ___Yes ___No         Vomiting:  ___Yes ___No         Drainage: NO        Skin Integrity: INCISIONS (Normal; Pressure Ulcer (Location)        RFO: ___Yes_X__No (identify item if present)X        SSI Patient?  ___Yes___No (if yes, see checklist for actions)        Brace/sling/equipment:  ___Yes_X__No (identify item if present)         See PACU record for ongoing assessment, vital signs and pain assessment.    R: Post-Op vitals and assessments as ordered/indicated per patient's condition.       Follow Post-Op orders and notify Physician prn.       Continue to involve patient/family in plan of care and discharge planning.       Reinforce Pre-Operative education.       Implement skin safety interventions as appropriate.
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
## 2018-12-24 NOTE — ED PROVIDER NOTES
History     Chief Complaint   Patient presents with     Wound Check     HPI  Cristopher Shaikh IV is a 38 year old male who presents for evaluation of a swollen, painful, and erythematous hand starting abruptly at 3 PM this afternoon per his report.  Initial injury was yesterday afternoon when he suffered a laceration of the right hand in between the fourth and fifth fingers webspace extending up and at the dorsum of the hand.  He states that he did pour hydrogen peroxide in the wound, and then decided to glue it on his own.  He thinks that he may be saw the tendon.  He denied any alteration to his finger flexion/extension capability and he had intact sensation per his report.  He denies any fevers or chills.  Does have some nausea, but no vomiting.  He states that he still has full range of motion of his fingers and hand, but it does increase his discomfort.  Pain is currently rated 6 on a scale of 10.  He denies taking anything for the pain to this point.        Problem List:    Patient Active Problem List    Diagnosis Date Noted     Finger mass, right 05/03/2012     Priority: Medium     Raynaud disease 04/27/2012     Priority: Medium     CARDIOVASCULAR SCREENING; LDL GOAL LESS THAN 160 04/27/2012     Priority: Medium        Past Medical History:    Past Medical History:   Diagnosis Date     Raynaud disease 4/27/2012       Past Surgical History:    Past Surgical History:   Procedure Laterality Date     EXCISE MASS FINGER  5/3/2012    Procedure:EXCISE MASS FINGER; excision mass right ring finger; Surgeon:SHERRY NG; Location:[facility] OR       Family History:    History reviewed. No pertinent family history.    Social History:  Marital Status:  Single [1]  Social History     Tobacco Use     Smoking status: Current Some Day Smoker     Smokeless tobacco: Never Used   Substance Use Topics     Alcohol use: Yes     Alcohol/week: 3.0 oz     Types: 6 Cans of beer per week     Drug use: No        Medications:      NIFEdipine  "(PROCARDIA XL) 60 MG TB24   oxyCODONE (ROXICODONE) 5 MG immediate release tablet         Review of Systems   All other systems reviewed and are negative.      Physical Exam   BP: (!) 170/98  Pulse: 92  Temp: 98.9  F (37.2  C)  Resp: 22  Height: 177.8 cm (5' 10\")  Weight: 95.3 kg (210 lb)  SpO2: 98 %      Physical Exam   Constitutional: He is oriented to person, place, and time. No distress.   HENT:   Head: Normocephalic and atraumatic.   Right Ear: External ear normal.   Left Ear: External ear normal.   Nose: Nose normal.   Mouth/Throat: Oropharynx is clear and moist. No oropharyngeal exudate.   Eyes: Conjunctivae and EOM are normal. Pupils are equal, round, and reactive to light. Right eye exhibits no discharge. Left eye exhibits no discharge. No scleral icterus.   Neck: Normal range of motion. Neck supple. No thyromegaly present.   Cardiovascular: Normal rate, regular rhythm, normal heart sounds and intact distal pulses.   No murmur heard.  Pulmonary/Chest: Effort normal and breath sounds normal. No respiratory distress. He has no wheezes. He has no rales. He exhibits no tenderness.   Abdominal: Soft. Bowel sounds are normal. He exhibits no distension and no mass. There is no tenderness. There is no rebound and no guarding.   Musculoskeletal: Normal range of motion. He exhibits no edema, tenderness or deformity.   Lymphadenopathy:     He has no cervical adenopathy.   Neurological: He is alert and oriented to person, place, and time. No cranial nerve deficit.   Skin: Skin is warm and dry. Capillary refill takes less than 2 seconds. Rash noted. He is not diaphoretic. There is erythema.   Evidence for a include laceration extending from the dorsal webspace between the fourth and fifth fingers extending into the dorsal hand.  There is surrounding erythema up the fourth and fifth fingers and then dorsally along the hand and into the distal one third of the forearm.  No fluctuance or induration to suggest underlying " abscess yet.   Psychiatric: He has a normal mood and affect. His behavior is normal. Thought content normal.   Nursing note and vitals reviewed.                 ED Course        Procedures               Critical Care time:  none               Results for orders placed or performed during the hospital encounter of 12/23/18 (from the past 24 hour(s))   CBC with platelets differential   Result Value Ref Range    WBC 9.5 4.0 - 11.0 10e9/L    RBC Count 4.93 4.4 - 5.9 10e12/L    Hemoglobin 15.0 13.3 - 17.7 g/dL    Hematocrit 42.6 40.0 - 53.0 %    MCV 86 78 - 100 fl    MCH 30.4 26.5 - 33.0 pg    MCHC 35.2 31.5 - 36.5 g/dL    RDW 11.9 10.0 - 15.0 %    Platelet Count 217 150 - 450 10e9/L    Diff Method Automated Method     % Neutrophils 65.3 %    % Lymphocytes 21.8 %    % Monocytes 11.6 %    % Eosinophils 0.6 %    % Basophils 0.3 %    % Immature Granulocytes 0.4 %    Nucleated RBCs 0 0 /100    Absolute Neutrophil 6.2 1.6 - 8.3 10e9/L    Absolute Lymphocytes 2.1 0.8 - 5.3 10e9/L    Absolute Monocytes 1.1 0.0 - 1.3 10e9/L    Absolute Basophils 0.0 0.0 - 0.2 10e9/L    Abs Immature Granulocytes 0.0 0 - 0.4 10e9/L    Absolute Nucleated RBC 0.0    Basic metabolic panel   Result Value Ref Range    Sodium 139 133 - 144 mmol/L    Potassium 3.9 3.4 - 5.3 mmol/L    Chloride 103 94 - 109 mmol/L    Carbon Dioxide 25 20 - 32 mmol/L    Anion Gap 11 3 - 14 mmol/L    Glucose 88 70 - 99 mg/dL    Urea Nitrogen 17 7 - 30 mg/dL    Creatinine 1.18 0.66 - 1.25 mg/dL    GFR Estimate 77 >60 mL/min/[1.73_m2]    GFR Estimate If Black 90 >60 mL/min/[1.73_m2]    Calcium 8.3 (L) 8.5 - 10.1 mg/dL   Lactic acid whole blood   Result Value Ref Range    Lactic Acid 0.8 0.7 - 2.0 mmol/L   CRP inflammation   Result Value Ref Range    CRP Inflammation 60.0 (H) 0.0 - 8.0 mg/L       Medications   ertapenem (INVanz) 1 g vial to attach to  mL bag (not administered)   vancomycin (VANCOCIN) 1,250 mg in sodium chloride 0.9 % 250 mL intermittent infusion (not  administered)   Tdap (tetanus-diphtheria-acell pertussis) (ADACEL) injection 0.5 mL (not administered)   ketorolac (TORADOL) injection 30 mg (30 mg Intravenous Given 12/23/18 2027)       Assessments & Plan (with Medical Decision Making)  Cellulitis of right hand     38 year old male presents for evaluation of an erythematous, painful, and swollen right hand starting at 3 PM today abruptly per his report.  Initial injury occurred yesterday mid afternoon when he lacerated his hand from the webspace of the fourth and fifth fingers into the dorsal hand while working on a rusted door frame.  He report hydrogen peroxide in the wound and then glued it himself.  On further questioning, he states that he did potentially see tendon tissue, but does not recall if there were any injuries to the tendon tissue.  Denied any alteration to his sensation or function of the hand.  Denies fevers or chills, but has had some nausea with his recent symptoms.  No other skin injuries.  On exam blood pressure 125/88, pulse 81, temperature 99.6, and oxygen saturation 96% on room air.  The right hand has significant erythema, swelling, and tenderness to palpation from the fourth and fifth fingers dorsally all the way into the distal one third of the dorsal forearm.  No induration or fluctuance to suggest underlying abscess quite yet.  He does have evidence for laceration in the webspace extending into the dorsal hand with glue on it.  No purulent drainage.  IV was established and he was given IV Toradol for discomfort.  White blood cell count was 9500 and hemoglobin stable at 15.0.  Basic metabolic panel all normal and lactic acid level normal at 0.8.  CRP elevated to 60.  2 blood cultures were obtained.  I spoke with orthopedics, Dr. Palomino, regarding the patient given my concern for potential tenosynovitis.  Dr. Palomino stated that this is primarily in the flexor tendons.  Patient is able to extend and flex the fingers at this time.  Dr. Palomino  recommended IV Invanz and IV vancomycin for broad-spectrum antibiotic coverage along with inpatient observation stay.  He agreed to see the patient in the morning for consultation.  Dr. Villar, inpatient hospitalist, kindly agreed to accept his care.  Dr. Conroy, ED MD, was involved with this patient's care as well.     I have reviewed the nursing notes.    I have reviewed the findings, diagnosis, plan and need for follow up with the patient.          Medication List      There are no discharge medications for this visit.         Final diagnoses:   Cellulitis of right hand       Disclaimer: This note consists of symbols derived from keyboarding, dictation and/or voice recognition software. As a result, there may be errors in the script that have gone undetected. Please consider this when interpreting information found in this chart.      12/23/2018   Jason Dunn PA-C   [facility] EMERGENCY DEPARTMENT     Jason Dunn PA-C  12/24/18 0010

## 2018-12-24 NOTE — ASSESSMENT & PLAN NOTE
Laceration involving left hand yesterday with increased redness and pain today  No signs of fever, normal WBC  Discussed with orthopedics, recommending invanz and vancomycin and they will consult in AM  [place] to observation, IV fluids overnite with oral pain managment

## 2018-12-24 NOTE — ANESTHESIA PREPROCEDURE EVALUATION
Anesthesia Pre-Procedure Evaluation    Patient: Cristopher Shaikh IV   MRN: [id number] : 1980          Preoperative Diagnosis: Right Hand Infection    Procedure(s):  COMBINED IRRIGATION AND DEBRIDEMENT HAND    Past Medical History:   Diagnosis Date     Raynaud disease 2012     Past Surgical History:   Procedure Laterality Date     EXCISE MASS FINGER  5/3/2012    Procedure:EXCISE MASS FINGER; excision mass right ring finger; Surgeon:SHERRY NG; Location:[facility] OR       Anesthesia Evaluation     . Pt has had prior anesthetic. Type: General           ROS/MED HX    ENT/Pulmonary:  - neg pulmonary ROS   (+)tobacco use, Current use 1 packs/day  , . .    Neurologic:  - neg neurologic ROS     Cardiovascular:  - neg cardiovascular ROS      Pacemaker: 1 ppd.   METS/Exercise Tolerance:     Hematologic:  - neg hematologic  ROS       Musculoskeletal:   (+) , , other musculoskeletal- Lt hand cellulitis      GI/Hepatic:  - neg GI/hepatic ROS       Renal/Genitourinary:  - ROS Renal section negative       Endo:  - neg endo ROS       Psychiatric:  - neg psychiatric ROS       Infectious Disease:  - neg infectious disease ROS       Malignancy:      - no malignancy   Other:    - neg other ROS                      Physical Exam  Normal systems: pulmonary and dental    Airway   Mallampati: II  TM distance: >3 FB  Neck ROM: full    Dental     Cardiovascular   Rhythm and rate: regular and normal      Pulmonary             Lab Results   Component Value Date    WBC 9.5 2018    HGB 15.0 2018    HCT 42.6 2018     2018    CRP 60.0 (H) 2018     2018    POTASSIUM 3.6 2018    CHLORIDE 105 2018    CO2 24 2018    BUN 17 2018    CR 1.19 2018    GLC 96 2018    CLOTILDE 7.9 (L) 2018       Preop Vitals  BP Readings from Last 3 Encounters:   18 112/74   12 130/82   12 132/88    Pulse Readings from Last 3 Encounters:   18 81   12  "86   04/27/12 72      Resp Readings from Last 3 Encounters:   12/24/18 16   06/02/12 18   05/03/12 16    SpO2 Readings from Last 3 Encounters:   12/24/18 96%   05/03/12 97%   02/27/12 99%      Temp Readings from Last 1 Encounters:   12/24/18 97  F (36.1  C) (Oral)    Ht Readings from Last 1 Encounters:   12/23/18 1.778 m (5' 10\")      Wt Readings from Last 1 Encounters:   12/23/18 104.8 kg (231 lb 0.7 oz)    Estimated body mass index is 33.15 kg/m  as calculated from the following:    Height as of this encounter: 1.778 m (5' 10\").    Weight as of this encounter: 104.8 kg (231 lb 0.7 oz).       Anesthesia Plan      History & Physical Review  History and physical reviewed and following examination; no interval change.    ASA Status:  2 .    NPO Status:  > 8 hours    Plan for General with Intravenous induction. Maintenance will be Balanced.    PONV prophylaxis:  Ondansetron (or other 5HT-3) and Dexamethasone or Solumedrol       Postoperative Care  Postoperative pain management:  IV analgesics and Oral pain medications.      Consents  Anesthetic plan, risks, benefits and alternatives discussed with:  Patient.  Use of blood products discussed: No .   .                 IDANIA Woods CRNA  "

## 2018-12-25 ENCOUNTER — APPOINTMENT (OUTPATIENT)
Dept: OCCUPATIONAL THERAPY | Facility: CLINIC | Age: 38
DRG: 580 | End: 2018-12-25

## 2018-12-25 LAB
GLUCOSE BLDC GLUCOMTR-MCNC: 117 MG/DL (ref 70–99)
HGB BLD-MCNC: 13.3 G/DL (ref 13.3–17.7)
LACTATE BLD-SCNC: 0.7 MMOL/L (ref 0.7–2)
WBC # BLD AUTO: 10.1 10E9/L (ref 4–11)

## 2018-12-25 PROCEDURE — 40000893 ZZH STATISTIC PT IP EVAL DEFER: Performed by: PHYSICAL THERAPIST

## 2018-12-25 PROCEDURE — 40000133 ZZH STATISTIC OT WARD VISIT: Performed by: OCCUPATIONAL THERAPIST

## 2018-12-25 PROCEDURE — 36415 COLL VENOUS BLD VENIPUNCTURE: CPT | Performed by: ORTHOPAEDIC SURGERY

## 2018-12-25 PROCEDURE — 00000146 ZZHCL STATISTIC GLUCOSE BY METER IP

## 2018-12-25 PROCEDURE — 99231 SBSQ HOSP IP/OBS SF/LOW 25: CPT | Performed by: INTERNAL MEDICINE

## 2018-12-25 PROCEDURE — 83605 ASSAY OF LACTIC ACID: CPT | Performed by: FAMILY MEDICINE

## 2018-12-25 PROCEDURE — 85048 AUTOMATED LEUKOCYTE COUNT: CPT | Performed by: ORTHOPAEDIC SURGERY

## 2018-12-25 PROCEDURE — 97165 OT EVAL LOW COMPLEX 30 MIN: CPT | Mod: GO | Performed by: OCCUPATIONAL THERAPIST

## 2018-12-25 PROCEDURE — 99207 ZZC CDG-MDM COMPONENT: MEETS LOW - DOWN CODED: CPT | Performed by: INTERNAL MEDICINE

## 2018-12-25 PROCEDURE — 85018 HEMOGLOBIN: CPT | Performed by: ORTHOPAEDIC SURGERY

## 2018-12-25 PROCEDURE — 97535 SELF CARE MNGMENT TRAINING: CPT | Mod: GO | Performed by: OCCUPATIONAL THERAPIST

## 2018-12-25 PROCEDURE — 12000000 ZZH R&B MED SURG/OB

## 2018-12-25 PROCEDURE — 25000128 H RX IP 250 OP 636: Performed by: FAMILY MEDICINE

## 2018-12-25 PROCEDURE — 25000132 ZZH RX MED GY IP 250 OP 250 PS 637: Performed by: ORTHOPAEDIC SURGERY

## 2018-12-25 RX ADMIN — AMPICILLIN SODIUM AND SULBACTAM SODIUM 3 G: 2; 1 INJECTION, POWDER, FOR SOLUTION INTRAMUSCULAR; INTRAVENOUS at 11:35

## 2018-12-25 RX ADMIN — ACETAMINOPHEN 975 MG: 325 TABLET ORAL at 04:47

## 2018-12-25 RX ADMIN — OXYCODONE HYDROCHLORIDE 10 MG: 5 TABLET ORAL at 09:19

## 2018-12-25 RX ADMIN — OXYCODONE HYDROCHLORIDE 10 MG: 5 TABLET ORAL at 13:00

## 2018-12-25 RX ADMIN — AMPICILLIN SODIUM AND SULBACTAM SODIUM 3 G: 2; 1 INJECTION, POWDER, FOR SOLUTION INTRAMUSCULAR; INTRAVENOUS at 04:42

## 2018-12-25 RX ADMIN — ACETAMINOPHEN 975 MG: 325 TABLET ORAL at 14:02

## 2018-12-25 RX ADMIN — OXYCODONE HYDROCHLORIDE 10 MG: 5 TABLET ORAL at 01:29

## 2018-12-25 RX ADMIN — AMPICILLIN SODIUM AND SULBACTAM SODIUM 3 G: 2; 1 INJECTION, POWDER, FOR SOLUTION INTRAMUSCULAR; INTRAVENOUS at 23:22

## 2018-12-25 RX ADMIN — DOCUSATE SODIUM 100 MG: 100 CAPSULE, LIQUID FILLED ORAL at 09:19

## 2018-12-25 RX ADMIN — OXYCODONE HYDROCHLORIDE 10 MG: 5 TABLET ORAL at 21:29

## 2018-12-25 RX ADMIN — AMPICILLIN SODIUM AND SULBACTAM SODIUM 3 G: 2; 1 INJECTION, POWDER, FOR SOLUTION INTRAMUSCULAR; INTRAVENOUS at 17:00

## 2018-12-25 RX ADMIN — OXYCODONE HYDROCHLORIDE 10 MG: 5 TABLET ORAL at 16:59

## 2018-12-25 RX ADMIN — DOCUSATE SODIUM 100 MG: 100 CAPSULE, LIQUID FILLED ORAL at 21:29

## 2018-12-25 RX ADMIN — ACETAMINOPHEN 975 MG: 325 TABLET ORAL at 21:29

## 2018-12-25 ASSESSMENT — ACTIVITIES OF DAILY LIVING (ADL)
ADLS_ACUITY_SCORE: 10
PREVIOUS_RESPONSIBILITIES: MEAL PREP;HOUSEKEEPING;LAUNDRY;SHOPPING;YARDWORK;MEDICATION MANAGEMENT;FINANCES;DRIVING;WORK;CHILD CARE
ADLS_ACUITY_SCORE: 10
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
## 2018-12-25 NOTE — PLAN OF CARE
VSS, pain to right hand tolerable with prn oxycodone and scheduled Tylenol. CMS intact, dressing CDI, dressing changed by PA today. Lungs clears. Bowels active. Denies nausea. Good po intake and output. Patient up independently in his room and ambulating halls. Continue to monitor patient and plan of care.

## 2018-12-25 NOTE — PLAN OF CARE
Discharge Planner OT   Patient plan for discharge: Home with assist as needed  Current status: OT order received and evaluation completed.  IND with all BADL/IADLs, including functional transfers.  No LOB.  Impulsive actions and movements with activities.  OT discussed with PT, no need for PT evaluation at this time.  Barriers to return to prior living situation: none  Recommendations for discharge: home   Rationale for recommendations: Pt. Doing well.  Adequate for discharge with follow up in outpatient OT services as needed for ROM and strengthening of the right hand.         Entered by: Shonna Guerin 12/25/2018 9:37 AM

## 2018-12-25 NOTE — PLAN OF CARE
Consulted with OT, who completed chart review and determined they would see pt first.  After OT eval, consulted with OT again, who stated the pt was steady with all mobility and at baseline for all mobility needs.  No further evaluation by PT needed as they were able to determine proper discontinue recommendations for safe discharge disposition.  PT will complete PT order.

## 2018-12-25 NOTE — PLAN OF CARE
"S-(situation): End of shift note    B-(background): Rt hand cellulitis    A-(assessment): /73   Pulse 75   Temp 97.4  F (36.3  C) (Oral)   Resp 16   Ht 1.778 m (5' 10\")   Wt 104.8 kg (231 lb 0.7 oz)   SpO2 96%   BMI 33.15 kg/m  . Vital signs stable.  Afebrile. Lung sounds CTA B/L.  IS in use to 3000. On RA.  A&O x4. Advanced to regular diet and tolerating well. Denies N&V. BM x1 and passing gas. Pain controlled with oxycodone per MAR. Ambulating independently.  Voiding adequately. SCDs in place. RUE is warm to touch. Denies numbness or tingling. Dressing is CDI.     R-(recommendations): Continue to monitor per care plan.     "

## 2018-12-26 LAB
GLUCOSE SERPL-MCNC: 98 MG/DL (ref 70–99)
HGB BLD-MCNC: 13.1 G/DL (ref 13.3–17.7)

## 2018-12-26 PROCEDURE — 99207 ZZC CDG-MDM COMPONENT: MEETS LOW - DOWN CODED: CPT | Performed by: HOSPITALIST

## 2018-12-26 PROCEDURE — 99231 SBSQ HOSP IP/OBS SF/LOW 25: CPT | Performed by: HOSPITALIST

## 2018-12-26 PROCEDURE — 25000128 H RX IP 250 OP 636: Performed by: FAMILY MEDICINE

## 2018-12-26 PROCEDURE — 36415 COLL VENOUS BLD VENIPUNCTURE: CPT | Performed by: ORTHOPAEDIC SURGERY

## 2018-12-26 PROCEDURE — 82947 ASSAY GLUCOSE BLOOD QUANT: CPT | Performed by: ORTHOPAEDIC SURGERY

## 2018-12-26 PROCEDURE — 12000000 ZZH R&B MED SURG/OB

## 2018-12-26 PROCEDURE — 25000132 ZZH RX MED GY IP 250 OP 250 PS 637: Performed by: ORTHOPAEDIC SURGERY

## 2018-12-26 PROCEDURE — 85018 HEMOGLOBIN: CPT | Performed by: ORTHOPAEDIC SURGERY

## 2018-12-26 RX ADMIN — AMPICILLIN SODIUM AND SULBACTAM SODIUM 3 G: 2; 1 INJECTION, POWDER, FOR SOLUTION INTRAMUSCULAR; INTRAVENOUS at 23:35

## 2018-12-26 RX ADMIN — ACETAMINOPHEN 975 MG: 325 TABLET ORAL at 13:47

## 2018-12-26 RX ADMIN — OXYCODONE HYDROCHLORIDE 5 MG: 5 TABLET ORAL at 10:54

## 2018-12-26 RX ADMIN — ACETAMINOPHEN 975 MG: 325 TABLET ORAL at 21:19

## 2018-12-26 RX ADMIN — ACETAMINOPHEN 975 MG: 325 TABLET ORAL at 05:34

## 2018-12-26 RX ADMIN — OXYCODONE HYDROCHLORIDE 10 MG: 5 TABLET ORAL at 05:34

## 2018-12-26 RX ADMIN — AMPICILLIN SODIUM AND SULBACTAM SODIUM 3 G: 2; 1 INJECTION, POWDER, FOR SOLUTION INTRAMUSCULAR; INTRAVENOUS at 05:33

## 2018-12-26 RX ADMIN — OXYCODONE HYDROCHLORIDE 10 MG: 5 TABLET ORAL at 00:26

## 2018-12-26 RX ADMIN — AMPICILLIN SODIUM AND SULBACTAM SODIUM 3 G: 2; 1 INJECTION, POWDER, FOR SOLUTION INTRAMUSCULAR; INTRAVENOUS at 10:47

## 2018-12-26 RX ADMIN — OXYCODONE HYDROCHLORIDE 5 MG: 5 TABLET ORAL at 17:03

## 2018-12-26 RX ADMIN — OXYCODONE HYDROCHLORIDE 10 MG: 5 TABLET ORAL at 21:19

## 2018-12-26 RX ADMIN — AMPICILLIN SODIUM AND SULBACTAM SODIUM 3 G: 2; 1 INJECTION, POWDER, FOR SOLUTION INTRAMUSCULAR; INTRAVENOUS at 16:56

## 2018-12-26 ASSESSMENT — ACTIVITIES OF DAILY LIVING (ADL)
ADLS_ACUITY_SCORE: 10

## 2018-12-26 NOTE — PLAN OF CARE
VSS, pain comfortably manageable with scheduled Tylenol and prn oxycodone. Patient had only 1 dose of 5mg oxycodone today. CMS intact, +1 edema to fingertips. Denies numbness or tingling. Dressing CDI. Lungs are clear. Bowels active. Good oral intake and output. Continue to monitor patient and plan of care.

## 2018-12-26 NOTE — PLAN OF CARE
S-(situation): End of shift note    B-(background): Cellulitis of rt hand    A-(assessment): Vital signs stable.  Lung sounds clear bilaterally. IS in use. On RA.  A&O. Pain controlled with oxy per MAR.  Bowel sounds normoactive. BM x1. Ambulating independently. Voiding adequately. Edema +1 in rt fingertips. Warm to touch with no discoloration. Bandages CDI. Ice and elevation continued.     R-(recommendations): Continue to monitor per care plan

## 2018-12-26 NOTE — PROGRESS NOTES
[facility] Progress Note  Cristopher Shaikh IV  male 38 year old   Hospital day # 2         Assessment and Plan:   Assessment:   Principal problem  Cellulitis of left upper extremity    Assessment: Stable and he is having dressing with Ace wrap.  No drainage noted.    Plan: Was initially on vanco and invanz, was seen by Dr. Palomino and assessed as having a severe cellulitis of the dorsum of the right hand and taken to the OR 12/24 where there was considerable pus and some shredding of 1 of the extensor tendons.  He will need longer-term antibiotics and was switched to Unasyn 2 g every 6 hours until we get wound culture and sensitivities back.  Local treatment is managed by orthopedics and antibiotics and He was informed that this kind of infection can be extremely serious and aggressive treatment is most appropriate.    Active Problems:    Raynaud disease    Assessment: Stable    Plan: We will monitor       Current smoker    Assessment: Stable    Plan: On nicotine patch.                Interval History:   Patient does not have any complaints.  He is getting dressing changes by orthopedic surgery.  He denies fever, chills, pain.          Review of Systems:   The Review of Systems is negative other than noted in the HPI          Medications:     Current Facility-Administered Medications   Medication     acetaminophen (TYLENOL) tablet 975 mg     ampicillin-sulbactam (UNASYN) 3 g vial to attach to  mL bag     docusate sodium (COLACE) capsule 100 mg     HYDROmorphone (PF) (DILAUDID) injection 0.3-0.5 mg     lactated ringers infusion     lidocaine (LMX4) kit     lidocaine 1 % 1 mL     naloxone (NARCAN) injection 0.1-0.4 mg     ondansetron (ZOFRAN-ODT) ODT tab 4 mg    Or     ondansetron (ZOFRAN) injection 4 mg     oxyCODONE (ROXICODONE) tablet 5-10 mg     prochlorperazine (COMPAZINE) injection 10 mg    Or     prochlorperazine (COMPAZINE) tablet 10 mg     sodium chloride (PF) 0.9% PF flush 3 mL     sodium chloride  (PF) 0.9% PF flush 3 mL             Physical Exam:   Vitals were reviewed  Temp: 98.1  F (36.7  C) Temp src: Oral BP: 133/80 Pulse: 71 Heart Rate: 71 Resp: 18 SpO2: 99 % O2 Device: None (Room air)    Constitutional:   awake, alert, cooperative, no apparent distress, and appears stated age     Lungs:   No increased work of breathing, good air exchange, clear to auscultation bilaterally, no crackles or wheezing     Cardiovascular:   Normal apical impulse, regular rate and rhythm, normal S1 and S2, no S3 or S4, and no murmur noted     Abdomen:   No scars, normal bowel sounds, soft, non-distended, non-tender, no masses palpated, no hepatosplenomegally   Skin- his right extremity is dressed         Data:     Results for orders placed or performed during the hospital encounter of 12/23/18 (from the past 24 hour(s))   Hemoglobin   Result Value Ref Range    Hemoglobin 13.1 (L) 13.3 - 17.7 g/dL   Glucose   Result Value Ref Range    Glucose 98 70 - 99 mg/dL     Attestation:  I have reviewed today's vital signs, notes, medications, labs and imaging.     Maryam Angel MD  12/26/2018

## 2018-12-26 NOTE — PROGRESS NOTES
"[facility]  Orthopedics Progress Note           Assessment and Plan:    Assessment:   Post-operative day #2 Procedure(s):  COMBINED IRRIGATION AND DEBRIDEMENT HAND   Previous little finger extensor and flexor tendon injury - distant past      Plan:   Doing well.  No immediate surgical complications identified.  No excessive bleeding  Pain well-controlled.  Encourage IS  Start or continue physical therapy  Activity as tolerated  No use of right arm/hand. No pull push lift. Splint to remain on.  Discharge pending sensitivities  Pain control measures  Advance diet as tolerated  Routine wound care  DVT Prophylaxis: [facility]  Hospitalist following and assisting in medical issues.            Interval History:   Doing well.  Continues to improve.  Pain is well-controlled.  No fevers.  States feeling better again today.  States hand is doing better and feels good in splint.  Voiding. Independent in room. Denies symptoms other than mild pain.  Expressing desire to discharge.  Currently on Unasyn q6 hours IV. Spoke with medicine they are recommending continue the IV antibiotic until clinically improving and cultures/sensitivity returns then change to oral antibiotics based on this and discharge at that time.            Review of Systems:    The patient denies any chest pain, shortness of breath, excessive pain, fever, chills, purulent drainage from the wound, nausea or vomiting.               Physical Exam:   General: awake, alert, appropriate and in no acute distress  Blood pressure 133/80, pulse 71, temperature 98.1  F (36.7  C), temperature source Oral, resp. rate 18, height 1.778 m (5' 10\"), weight 104.8 kg (231 lb 0.7 oz), SpO2 99 %.  Right upper extremity:  Dressing clean, dry and intact. Sensation intact to fingers, fingers well perfused. Dressings and splint removed. Small amount of dried serosanguinous drainage on the dressing.  No active bleeding. Wound approximated with non-absorbable sutures. No " purulence on dressings. Erythema has resolved, swelling improved again from yesterday. Mildly tender around incision.  New sterile 4x4 placed followed by cast padding, splint reapplied then ACE wrap replaced. After dressings and splint replaced, sensation intact to all fingers. Cap refill <2.  Distal neurovascular grossly intact.  Compartments soft and non-tender. Elbow motion is normal.  Bilateral calf soft and non-tender.              Data:     Results for orders placed or performed during the hospital encounter of 12/23/18 (from the past 24 hour(s))   Hemoglobin   Result Value Ref Range    Hemoglobin 13.1 (L) 13.3 - 17.7 g/dL   Glucose   Result Value Ref Range    Glucose 98 70 - 99 mg/dL     IDANIA Roman, CNP  Orthopedic Surgery

## 2018-12-27 ENCOUNTER — TELEPHONE (OUTPATIENT)
Dept: INFECTIOUS DISEASES | Facility: CLINIC | Age: 38
End: 2018-12-27

## 2018-12-27 VITALS
BODY MASS INDEX: 33.08 KG/M2 | HEIGHT: 70 IN | HEART RATE: 70 BPM | SYSTOLIC BLOOD PRESSURE: 126 MMHG | TEMPERATURE: 97.2 F | DIASTOLIC BLOOD PRESSURE: 80 MMHG | OXYGEN SATURATION: 97 % | RESPIRATION RATE: 18 BRPM | WEIGHT: 231.04 LBS

## 2018-12-27 LAB
BACTERIA SPEC CULT: ABNORMAL
BACTERIA SPEC CULT: ABNORMAL
Lab: ABNORMAL
SPECIMEN SOURCE: ABNORMAL

## 2018-12-27 PROCEDURE — 25000132 ZZH RX MED GY IP 250 OP 250 PS 637: Performed by: ORTHOPAEDIC SURGERY

## 2018-12-27 PROCEDURE — 25000128 H RX IP 250 OP 636: Performed by: FAMILY MEDICINE

## 2018-12-27 PROCEDURE — 99238 HOSP IP/OBS DSCHRG MGMT 30/<: CPT | Performed by: FAMILY MEDICINE

## 2018-12-27 RX ORDER — DOCUSATE SODIUM 100 MG/1
100 CAPSULE, LIQUID FILLED ORAL 2 TIMES DAILY PRN
Qty: 50 CAPSULE | Refills: 0 | Status: SHIPPED | OUTPATIENT
Start: 2018-12-27 | End: 2018-12-27

## 2018-12-27 RX ORDER — OXYCODONE HYDROCHLORIDE 5 MG/1
5 TABLET ORAL 3 TIMES DAILY PRN
Qty: 30 TABLET | Refills: 0 | Status: SHIPPED | OUTPATIENT
Start: 2018-12-27 | End: 2018-12-30

## 2018-12-27 RX ADMIN — OXYCODONE HYDROCHLORIDE 10 MG: 5 TABLET ORAL at 05:05

## 2018-12-27 RX ADMIN — OXYCODONE HYDROCHLORIDE 10 MG: 5 TABLET ORAL at 11:09

## 2018-12-27 RX ADMIN — ACETAMINOPHEN 975 MG: 325 TABLET ORAL at 05:06

## 2018-12-27 RX ADMIN — OXYCODONE HYDROCHLORIDE 10 MG: 5 TABLET ORAL at 01:42

## 2018-12-27 RX ADMIN — AMPICILLIN SODIUM AND SULBACTAM SODIUM 3 G: 2; 1 INJECTION, POWDER, FOR SOLUTION INTRAMUSCULAR; INTRAVENOUS at 11:10

## 2018-12-27 RX ADMIN — OXYCODONE HYDROCHLORIDE 10 MG: 5 TABLET ORAL at 08:06

## 2018-12-27 RX ADMIN — AMPICILLIN SODIUM AND SULBACTAM SODIUM 3 G: 2; 1 INJECTION, POWDER, FOR SOLUTION INTRAMUSCULAR; INTRAVENOUS at 05:06

## 2018-12-27 ASSESSMENT — ACTIVITIES OF DAILY LIVING (ADL)
ADLS_ACUITY_SCORE: 10

## 2018-12-27 NOTE — PROGRESS NOTES
"[facility]  Orthopedics Progress Note           Assessment and Plan:    Assessment:   Post-operative day #3 Procedure(s):  COMBINED IRRIGATION AND DEBRIDEMENT HAND   Reported previous little finger extensor and flexor tendon injury - distant past      Plan:   Doing well.  No immediate surgical complications identified.  No excessive bleeding  Pain well-controlled.  Encourage IS  Activity as tolerated  No use of right arm/hand. No pull push lift. Splint to remain on.  Discharge pending sensitivities.  Pain control measures  Advance diet as tolerated  Routine wound care: daily dressing change: sterile gauze followed by cast padding or kerlix wrap followed by splint then ace wrap.   DVT Prophylaxis: [facility]  Hospitalist following and assisting in medical issues.            Interval History:   Doing well.  Continues to improve.  Pain is well-controlled.  No fevers.  States feeling fine today.  States hand is doing better and feels good in splint.  Voiding. Independent in room. Denies symptoms other than mild pain.  Expressing desire to discharge.  Currently on Unasyn q6 hours IV. Spoke with medicine they are recommending continue the IV antibiotic until clinically improving and cultures/sensitivity returns then change to oral antibiotics based on this and discharge at that time.    Will have patient follow-up with infectious disease post-discharge.            Review of Systems:    The patient denies any chest pain, shortness of breath, excessive pain, fever, chills, purulent drainage from the wound, nausea or vomiting.               Physical Exam:   General: awake, alert, appropriate and in no acute distress  /80 (BP Location: Left arm)   Pulse 70   Temp 97.2  F (36.2  C) (Oral)   Resp 18   Ht 1.778 m (5' 10\")   Wt 104.8 kg (231 lb 0.7 oz)   SpO2 97%   BMI 33.15 kg/m    Right upper extremity:  Dressing clean, dry and intact. Sensation intact to fingers, fingers well perfused. Dressings and " splint removed. Very small amount of dried serosanguinous drainage on the dressing.  No active bleeding. Wound approximated with non-absorbable sutures. No purulence on dressings. swelling improved again from yesterday. Mildly tender around incision.  New sterile 4x4 placed followed by cast padding, splint reapplied then ACE wrap replaced. After dressings and splint replaced, sensation intact to all fingers. Cap refill <2. Radial pulse 2+.  Distal neurovascular grossly intact.  Compartments soft and non-tender. Elbow motion is normal.  Bilateral calf soft and non-tender.              Data:     No results found for this or any previous visit (from the past 24 hour(s)).  IDANIA Roman, CNP  Orthopedic Surgery

## 2018-12-27 NOTE — TELEPHONE ENCOUNTER
[facility] Call Center    Phone Message    May a detailed message be left on voicemail: yes    Reason for Call: Other: Pt is being discharged from the [facility] today and is being referred by Dr. Jim Palomino. The patient has an infection that is causing webbing between the fingers. Izabela will be faxing over a referral. Pt is recomended to be seen within 7 days of discharge and the first available is 01/15/19. Please contact patient to schedule within the recommended time frame. It is OK to call [facility] at [phone] for more information.      Action Taken: Message routed to:  [facility] ([facility]): ID

## 2018-12-27 NOTE — PLAN OF CARE
Vital signs stable on RA.  Afebrile.  A&O x4.  Pt complaining of hand pain, controlled with 10mg Oxycodone every 3-4hrs.  Lung sounds clear.  Bowel sounds normoactive, reports having loose stools, refused Colace.  Up independently, Ambulated to BR.  Voiding adequately  Skin is intact with exception to incision which is CURT due to dressing, Pt declines ice.  IV saline locked with exception to antibiotics, site asymptomatic.  Pt eager for sensitivity results and discharge.

## 2018-12-27 NOTE — DISCHARGE INSTRUCTIONS
Incision and Drainage/Hand Infection Discharge Instructions                                    [phone]  Bone and Joint Service Line for issues or concerns    General Care:  After surgery you may feel tired/sleepy. This is normal. Please have someone stay with you for 24 hours after surgery. You should avoid driving until released by your physician while taking narcotic medication or if you feel your  is altered. If you have any question along the way please contact the office. If you feel it is an issue cannot wait for normal office hours, contact the on-call physician.    Bandages:   Daily dressing changes: remove dressings and splint.  Then place new clean/sterile gauze over the incision site, lightly wrap with cast padding or kerlix wrap then replace splint, then wrap with ace wrap.  Otherwise keep dressing clean and dry.      Bathing:  Do not submerge your incision in water such as a bath or pool. Keep the incision clean and dry.        Due to recommendations, your appointment on Jan 7th for Infection Disease has been canceled.  If you wish to be seen with them here is thitatyana information.    [facility]  [address] [place] [address]  [phone]    [facility] Infection Disease  [place]  [phone]      Follow up:  Your follow up appointment should already be scheduled. If its not, please call the office to verify an appointment 7 days after surgery. Sutures will be removed at this visit.     Diet:  Start with non-alcoholic liquids at first, particularly water or sports drinks after surgery. Progress to bland foods such as crackers and bread and finally to your normal diet if you have no problems.  Do not mix alcohol with the pain medications.    Pain control:  Take your pain medications as prescribed. These medications may make you sleepy. Do not drive, operate equipment, or drink alcohol when taking these.  You may take Tylenol (Generic name is acetaminophen) as directed on  the bottle in place of the prescribed pain medications as your pain gets better. If the medications cause a reaction such as nausea or skin rash, stop taking them and contact your doctor. Please plan accordingly, pain medications will not be re-filled on the weekends or at night. Call the office during the day if you need more medications.    Activity:  Keep your hand elevated and ice for 20 minutes at a time. Avoid lifting, pushing, and or pulling with the operated hand.     Normal findings after surgery:  Numbness and tenderness around the incisions is normal.  You may have bruising around the incisions.  Low grade fevers less than 100.5 degrees Fahrenheit are normal.   Your finger will feel stiff/tight.    When to call the Office:  Temperature greater than 101.5 degrees Fahreheit.  Fever, chills, and increasing pain in the finger/hand.  Excessive drainage from the incisions that include bright red blood.  Drainage from the incisions sites that appear yellow, pus-like, or foul smelling.  Persistent nausea or vomiting.  Any other effects you feel are significant.

## 2018-12-27 NOTE — TELEPHONE ENCOUNTER
Pt scheduled with Dr. Mejía on 1/7/19 at 4pm. Called WM Gurrola at [facility], to confirm.  Keturah Jaquez RN
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
## 2018-12-27 NOTE — PROGRESS NOTES
Discussed discharge with hospitalist.  Hospitalist recommending no ID consult as cultures and sensitivities finalized, have an identified organism and medicine recommending 10 days PO Augmentin.  Discussed option of having PCP following patient to help with medical and antibiotic management instead of ID, hospitalist thought this was appropriate.    Will give option to patient, if he has a PCP that will follow along and help management antibiotics and the infection VS seeing ID outpatient either is acceptable.  Dr. Palomino agreeable to this plan as well.    IDANIA Roamn, CNP  Orthopedic Surgery
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
## 2018-12-30 LAB
BACTERIA SPEC CULT: NO GROWTH
BACTERIA SPEC CULT: NO GROWTH
Lab: NORMAL
Lab: NORMAL
SPECIMEN SOURCE: NORMAL
SPECIMEN SOURCE: NORMAL

## 2018-12-31 LAB
BACTERIA SPEC CULT: ABNORMAL
Lab: ABNORMAL
SPECIMEN SOURCE: ABNORMAL

## 2019-01-03 ENCOUNTER — OFFICE VISIT (OUTPATIENT)
Dept: ORTHOPEDICS | Facility: CLINIC | Age: 39
End: 2019-01-03

## 2019-01-03 VITALS — WEIGHT: 231 LBS | HEIGHT: 72 IN | TEMPERATURE: 98 F | BODY MASS INDEX: 31.29 KG/M2

## 2019-01-03 DIAGNOSIS — M00.841: Primary | ICD-10-CM

## 2019-01-03 DIAGNOSIS — L03.114 CELLULITIS OF LEFT UPPER EXTREMITY: ICD-10-CM

## 2019-01-03 PROCEDURE — 99024 POSTOP FOLLOW-UP VISIT: CPT | Performed by: ORTHOPAEDIC SURGERY

## 2019-01-03 ASSESSMENT — PAIN SCALES - GENERAL: PAINLEVEL: MODERATE PAIN (4)

## 2019-01-03 ASSESSMENT — MIFFLIN-ST. JEOR: SCORE: 1997.87

## 2019-01-03 NOTE — LETTER
1/3/2019         RE: Cristopher Shaikh IV  [address] [facility] [address]        Dear Colleague,    Thank you for referring your patient, Cristopher Shaikh IV, to the [facility]. Please see a copy of my visit note below.    Orthopedic Clinic Post-Operative Note    CHIEF COMPLAINT:   Chief Complaint   Patient presents with     Surgical Followup     DOS: 12/24/2018~Right hand incision, irrigation and excisional debridement with irrigation of small finger metacarpophalangeal joint~10 days postop       HISTORY OF PRESENT ILLNESS  Overall doing well.  Taking his antibiotics with no issues.  He has been using a splint.  Pain has been tolerable.  No numbness and tingling    He does describe a previous injury at the PIP joint of the small finger right side.  He reports he went through some therapy.  Never significantly improved.  He constantly has a flexion contracture to the PIP joint approximately 20-30 degrees.  Patient's past medical, surgical, social and family histories reviewed.     Past Medical History:   Diagnosis Date     Raynaud disease 4/27/2012       Past Surgical History:   Procedure Laterality Date     EXCISE MASS FINGER  5/3/2012    Procedure:EXCISE MASS FINGER; excision mass right ring finger; Surgeon:SHERRY NG; Location:[facility] OR     IRRIGATION AND DEBRIDEMENT HAND, COMBINED Right 12/24/2018    Procedure: COMBINED IRRIGATION AND DEBRIDEMENT HAND;  Surgeon: Patrick Palomino DO;  Location: [facility] OR       Medications:    Current Outpatient Medications on File Prior to Visit:  amoxicillin-clavulanate (AUGMENTIN) 875-125 MG tablet Take 1 tablet by mouth 2 times daily for 10 days   naproxen sodium 220 MG capsule Take 440 mg by mouth as needed   NIFEdipine (PROCARDIA XL) 60 MG TB24 Take 60 mg by mouth daily. (Patient not taking: Reported on 1/3/2019)     No current facility-administered medications on file prior to visit.     No Known Allergies    Social History     Occupational History      "Not on file   Tobacco Use     Smoking status: Former Smoker     Last attempt to quit: 3/1/2014     Years since quittin.8     Smokeless tobacco: Never Used   Substance and Sexual Activity     Alcohol use: Yes     Alcohol/week: 3.0 oz     Types: 6 Cans of beer per week     Drug use: No     Sexual activity: Yes     Partners: Female       No family history on file.    REVIEW OF SYSTEMS  General: negative for, night sweats, dizziness, fatigue  Resp: No shortness of breath and no cough  CV: negative for chest pain, syncope or near-syncope  GI: negative for nausea, vomiting and diarrhea  : negative for dysuria and hematuria  Musculoskeletal: as above  Neurologic: negative for syncope   Hematologic: negative for bleeding disorder    Physical Exam:  Vitals: Temp 98  F (36.7  C) (Temporal)   Ht 1.816 m (5' 11.5\")   Wt 104.8 kg (231 lb)   BMI 31.77 kg/m     BMI= Body mass index is 31.77 kg/m .  Constitutional: healthy, alert and no acute distress   Psychiatric: mentation appears normal and affect normal/bright  NEURO: no focal deficits  SKIN: .  The extended incisions were sutures are in place are well approximated.  There is no drainage or breakdown.  The open area from his injury is sealed over.  There is no drainage.  There is no erythema.  JOINT/EXTREMITIES: Fingers are warm and dry with good capillary refill.  There is no erythema.  Sensation is intact to light touch.  Motion not tested.  GAIT: not tested     Diagnostic Modalities:  None today.  Independent visualization of the images was performed.      Impression:   Chief Complaint   Patient presents with     Surgical Followup     DOS: 2018~Right hand incision, irrigation and excisional debridement with irrigation of small finger metacarpophalangeal joint~10 days postop   Right fifth MCP septic arthritis with extensor tendon laceration    Plan:   Keep the area clean and dry.  Sutures were removed today.  Keep splinting.  However it is okay to work on some " gentle flexion.  At some point he will probably need some repair to his extensor tendon.  Will eradicate the infection before repairing.  Will refer to hand surgery for this.  He sees infectious disease tomorrow.  Plan to see their recommendations.  I will plan to see him back in 2 weeks for wound check.    Return to clinic 2, week(s), or sooner as needed for changes.    Re-x-ray on return: No    Jace Palomino D.O.    Again, thank you for allowing me to participate in the care of your patient.        Sincerely,        Patrick Palomino, DO

## 2019-01-03 NOTE — PROGRESS NOTES
Orthopedic Clinic Post-Operative Note    CHIEF COMPLAINT:   Chief Complaint   Patient presents with     Surgical Followup     DOS: 2018~Right hand incision, irrigation and excisional debridement with irrigation of small finger metacarpophalangeal joint~10 days postop       HISTORY OF PRESENT ILLNESS  Overall doing well.  Taking his antibiotics with no issues.  He has been using a splint.  Pain has been tolerable.  No numbness and tingling    He does describe a previous injury at the PIP joint of the small finger right side.  He reports he went through some therapy.  Never significantly improved.  He constantly has a flexion contracture to the PIP joint approximately 20-30 degrees.  Patient's past medical, surgical, social and family histories reviewed.     Past Medical History:   Diagnosis Date     Raynaud disease 2012       Past Surgical History:   Procedure Laterality Date     EXCISE MASS FINGER  5/3/2012    Procedure:EXCISE MASS FINGER; excision mass right ring finger; Surgeon:SHERRY NG; Location:[facility] OR     IRRIGATION AND DEBRIDEMENT HAND, COMBINED Right 2018    Procedure: COMBINED IRRIGATION AND DEBRIDEMENT HAND;  Surgeon: Patrick Palomino DO;  Location: [facility] OR       Medications:    Current Outpatient Medications on File Prior to Visit:  amoxicillin-clavulanate (AUGMENTIN) 875-125 MG tablet Take 1 tablet by mouth 2 times daily for 10 days   naproxen sodium 220 MG capsule Take 440 mg by mouth as needed   NIFEdipine (PROCARDIA XL) 60 MG TB24 Take 60 mg by mouth daily. (Patient not taking: Reported on 1/3/2019)     No current facility-administered medications on file prior to visit.     No Known Allergies    Social History     Occupational History     Not on file   Tobacco Use     Smoking status: Former Smoker     Last attempt to quit: 3/1/2014     Years since quittin.8     Smokeless tobacco: Never Used   Substance and Sexual Activity     Alcohol use: Yes     Alcohol/week: 3.0 oz  "    Types: 6 Cans of beer per week     Drug use: No     Sexual activity: Yes     Partners: Female       No family history on file.    REVIEW OF SYSTEMS  General: negative for, night sweats, dizziness, fatigue  Resp: No shortness of breath and no cough  CV: negative for chest pain, syncope or near-syncope  GI: negative for nausea, vomiting and diarrhea  : negative for dysuria and hematuria  Musculoskeletal: as above  Neurologic: negative for syncope   Hematologic: negative for bleeding disorder    Physical Exam:  Vitals: Temp 98  F (36.7  C) (Temporal)   Ht 1.816 m (5' 11.5\")   Wt 104.8 kg (231 lb)   BMI 31.77 kg/m    BMI= Body mass index is 31.77 kg/m .  Constitutional: healthy, alert and no acute distress   Psychiatric: mentation appears normal and affect normal/bright  NEURO: no focal deficits  SKIN: .  The extended incisions were sutures are in place are well approximated.  There is no drainage or breakdown.  The open area from his injury is sealed over.  There is no drainage.  There is no erythema.  JOINT/EXTREMITIES: Fingers are warm and dry with good capillary refill.  There is no erythema.  Sensation is intact to light touch.  Motion not tested.  GAIT: not tested     Diagnostic Modalities:  None today.  Independent visualization of the images was performed.      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  Sutures were removed today.  Keep splinting.  However it is okay to work on some gentle flexion.  At some point he will probably need some repair to his extensor tendon.  Will eradicate the infection before repairing.  Will refer to hand surgery for this.  He sees infectious disease tomorrow.  Plan to see their " recommendations.  I will plan to see him back in 2 weeks for wound check.    Return to clinic 2, week(s), or sooner as needed for changes.    Re-x-ray on return: No    Jace Palomino D.O.

## 2019-01-04 ENCOUNTER — TRANSFERRED RECORDS (OUTPATIENT)
Dept: HEALTH INFORMATION MANAGEMENT | Facility: CLINIC | Age: 39
End: 2019-01-04

## 2019-01-04 NOTE — PROGRESS NOTES
"Cristopher Shaikh IV  Gender: male  : 1980  [address]  [facility] [address]  [phone] (home)     Medical Record: [id number]  Pharmacy: [id number] - [place], [place] - [address] [place] [address]  Primary Care Provider: No Ref-Primary, Physician    Parent's names are: Data Unavailable (mother) and Data Unavailable (father).      [facility]  2019     Discharge Phone Call:  Key Words/Key Times      Introduction - AIDET (Acknowledge, Introduce, Duration, Explanation)      Empathy-   We are calling to see how you are since your recent stay in the hospital?     Call back COMMENTS: I'm doing ok, I saw the infectious disease doctor today and now the infection is in my bone, so we had to change my antibiotics and I will see Dr. Palomino again in a few weeks.      Clinical Questions -  (f/u appts, medication side effects/purpose, ability to care for self at home) \"For your safety, it is important to us that you understand the purpose and side effects of your medications, can you tell me what your new medications are?\"     Call back COMMENTS: Yes, I am taking my antibiotics and pain meds when I need to.      Staff Recognition -  We like to recognize staff and physicians who have done an excellent job.  Do you remember any people from your care team that you would like recognize?     Call back COMMENTS: Everyone was amazing, But Therese and Suze were really nice.      Very Good Care -  We want to provide very good care to all patients.  How was your care?     Call back COMMENTS: It was great, everyone took great care of me.      Opportunities for Improvement -  Our goal is to be the best.  Do you have any suggestions for things that we could improve upon?     Call back COMMENTS: No, nothing.       Thank You           "

## 2025-04-29 ENCOUNTER — ANCILLARY PROCEDURE (OUTPATIENT)
Dept: GENERAL RADIOLOGY | Facility: OTHER | Age: 45
End: 2025-04-29
Attending: PHYSICIAN ASSISTANT

## 2025-04-29 ENCOUNTER — OFFICE VISIT (OUTPATIENT)
Dept: FAMILY MEDICINE | Facility: OTHER | Age: 45
End: 2025-04-29

## 2025-04-29 VITALS
HEART RATE: 96 BPM | HEIGHT: 70 IN | DIASTOLIC BLOOD PRESSURE: 78 MMHG | RESPIRATION RATE: 20 BRPM | TEMPERATURE: 99 F | WEIGHT: 214.5 LBS | SYSTOLIC BLOOD PRESSURE: 124 MMHG | BODY MASS INDEX: 30.71 KG/M2

## 2025-04-29 DIAGNOSIS — M89.9 SKULL LESION: Primary | ICD-10-CM

## 2025-04-29 DIAGNOSIS — M89.9 SKULL LESION: ICD-10-CM

## 2025-04-29 PROCEDURE — 99213 OFFICE O/P EST LOW 20 MIN: CPT | Performed by: PHYSICIAN ASSISTANT

## 2025-04-29 PROCEDURE — G2211 COMPLEX E/M VISIT ADD ON: HCPCS | Performed by: PHYSICIAN ASSISTANT

## 2025-04-29 PROCEDURE — 70250 X-RAY EXAM OF SKULL: CPT | Mod: TC | Performed by: RADIOLOGY

## 2025-04-29 ASSESSMENT — PAIN SCALES - GENERAL: PAINLEVEL_OUTOF10: NO PAIN (0)
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
## 2025-05-01 ENCOUNTER — OFFICE VISIT (OUTPATIENT)
Dept: FAMILY MEDICINE | Facility: OTHER | Age: 45
End: 2025-05-01

## 2025-05-01 VITALS
HEIGHT: 70 IN | RESPIRATION RATE: 12 BRPM | SYSTOLIC BLOOD PRESSURE: 106 MMHG | DIASTOLIC BLOOD PRESSURE: 68 MMHG | BODY MASS INDEX: 30.64 KG/M2 | OXYGEN SATURATION: 95 % | WEIGHT: 214 LBS | HEART RATE: 87 BPM | TEMPERATURE: 99.2 F

## 2025-05-01 DIAGNOSIS — R22.0 SCALP MASS: Primary | ICD-10-CM

## 2025-05-01 RX ORDER — LIDOCAINE HYDROCHLORIDE AND EPINEPHRINE 10; 10 MG/ML; UG/ML
5 INJECTION, SOLUTION INFILTRATION; PERINEURAL ONCE
Status: COMPLETED | OUTPATIENT
Start: 2025-05-01 | End: 2025-05-01

## 2025-05-01 RX ADMIN — LIDOCAINE HYDROCHLORIDE AND EPINEPHRINE 5 ML: 10; 10 INJECTION, SOLUTION INFILTRATION; PERINEURAL at 12:11

## 2025-05-01 ASSESSMENT — PAIN SCALES - GENERAL: PAINLEVEL_OUTOF10: NO PAIN (0)

## 2025-05-01 NOTE — PATIENT INSTRUCTIONS
Please come at noon next week (not 2 pm)       Wound Care Instructions    1.  Keep area dry today.    2.  Starting tomorrow wash gently with soap and water once daily.      3.  Apply Vaseline or antibiotic ointment to the area and cover with a bandage if desired.  Do not let it dry out and form a scab as this will make the resulting scar more noticeable.    4. Protect the area from sun for up to one year afterward as the scar is continuing to remodel.  Sun exposure will also make the resulting scar more noticeable.    5.  Call if the area is very red, tender, has a discharge or is very itchy while healing, or if you have any other questions.  These may be signs of early infection or allergy.

## 2025-05-01 NOTE — PROGRESS NOTES
Assessment & Plan     ICD-10-CM    1. Scalp mass  R22.0           - Reviewed XR skull, small mineralization that is subcutaneous but no osseous lesion  - Discussed getting the CT vs. Proceeding with removal, patient would like to remove              The patient indicates understanding of these issues and agrees with the plan.    Follow up:     Konstantin Medina PA-C  [facility] [place]         Harley Nicholas is a 44 year old, presenting for the following health issues:  Hair/Scalp Problem (/)    HPI        Patient here for scalp mass removal             Review of Systems  Constitutional, neuro, ENT, endocrine, pulmonary, cardiac, gastrointestinal, genitourinary, musculoskeletal, integument and psychiatric systems are negative, except as otherwise noted.      Objective    There were no vitals taken for this visit.  There is no height or weight on file to calculate BMI.  Physical Exam   GENERAL APPEARANCE: healthy, alert and no distress  EYES: Eyes grossly normal to inspection, PERRLA, conjunctivae and sclerae without injection or discharge, EOM intact   MS: No musculoskeletal defects are noted and gait is age appropriate without ataxia   SKIN:   2 x 2 cm raised lesion on right upper forehead, seems to have 2 parts, one that is soft and slightly movable, not tender or warm, one that is hard and feels to be growth from the skull   No suspicious lesions or rashes, hydration status appears adeuqate with normal skin turgor   PSYCH: Alert and oriented x3; speech- coherent , normal rate and volume; able to articulate logical thoughts, able to abstract reason, no tangential thoughts, no hallucinations or delusions, mentation appears normal, Mood is euthymic. Affect is appropriate for this mood state and bright. Thought content is free of suicidal ideation, hallucinations, and delusions. Dress is adequate and upkept. Eye contact is good during conversation.       Diagnostics; none     Procedure  Note:  Pause for the cause has been completed prior to the prceedure.   Patient was identified by both name and date of birth   The correct site was identified   Written informed consent correct and signed    Verifed necessary supplies, equipment, and diagnostics are available    Time out was performed immediately prior to procedure    Objective: The lesion(s) is/are of the above mentioned size and location and is/are typical. The area was prepped using alcohol swabs and appropriately anesthetized using *** cc of 1% lidocaine with epi. Using the usual technique, {JW PROCEDURE:244552} was performed. Hemostasis was obtained using ***. An appropriate dressing was applied. The procedure was well tolerated and without complications.          Signed Electronically by: Caitlin Medina PA-C     was obtained using 6 single interrupted sutures of 5-0 ethilon. An appropriate dressing was applied. The procedure was well tolerated and without complications.          Signed Electronically by: Caitlin Medina PA-C

## 2025-05-08 ENCOUNTER — OFFICE VISIT (OUTPATIENT)
Dept: FAMILY MEDICINE | Facility: OTHER | Age: 45
End: 2025-05-08

## 2025-05-08 VITALS
SYSTOLIC BLOOD PRESSURE: 130 MMHG | TEMPERATURE: 98.5 F | DIASTOLIC BLOOD PRESSURE: 90 MMHG | RESPIRATION RATE: 16 BRPM | HEART RATE: 82 BPM

## 2025-05-08 DIAGNOSIS — R22.0 SCALP MASS: Primary | ICD-10-CM

## 2025-05-08 PROCEDURE — 99213 OFFICE O/P EST LOW 20 MIN: CPT | Performed by: PHYSICIAN ASSISTANT

## 2025-05-08 ASSESSMENT — PAIN SCALES - GENERAL: PAINLEVEL_OUTOF10: NO PAIN (0)

## 2025-05-08 NOTE — PROGRESS NOTES
Assessment & Plan     ICD-10-CM    1. Scalp mass  R22.0           - Wound doing well, removed sutures   - Reviewed CT imaging   - Unfortunately, pathology not back   - Discussed pending this, if wants further removal may need general surgery vs. Dermatology   - Discussed wound care     The patient indicates understanding of these issues and agrees with the plan.    Follow up: SIENA Ball-FLORIDALMA Dunn  [facility] [place]           Harley Nicholas is a 45 year old, presenting for the following health issues:  RECHECK        5/8/2025    11:34 AM   Additional Questions   Roomed by XIOMARA Oquendo   Accompanied by Self     History of Present Illness       Reason for visit:  To have a bump of scar tisue on my head looked at and get a refural to someone to get it removed   He is taking medications regularly.        Patient is here today to recheck scalp mass from procedure last week. Suture removal.                   Objective    /90   Pulse 82   Temp 98.5  F (36.9  C) (Temporal)   Resp 16   There is no height or weight on file to calculate BMI.  Physical Exam     GENERAL APPEARANCE: healthy, alert and no distress  EYES: Eyes grossly normal to inspection, PERRLA, conjunctivae and sclerae without injection or discharge, EOM intact   MS: No musculoskeletal defects are noted and gait is age appropriate without ataxia   SKIN:   Scalp - wound clean with no signs of infection, sutures removed   No suspicious lesions or rashes, hydration status appears adeuqate with normal skin turgor   PSYCH: Alert and oriented x3; speech- coherent , normal rate and volume; able to articulate logical thoughts, able to abstract reason, no tangential thoughts, no hallucinations or delusions, mentation appears normal, Mood is euthymic. Affect is appropriate for this mood state and bright. Thought content is free of suicidal ideation, hallucinations, and delusions. Dress is adequate and upkept. Eye contact is  good during conversation.       Diagnostics: reviewed in Epic         Signed Electronically by: Caitlin Medina PA-C

## 2025-05-12 ENCOUNTER — RESULTS FOLLOW-UP (OUTPATIENT)
Dept: FAMILY MEDICINE | Facility: OTHER | Age: 45
End: 2025-05-12

## 2025-05-12 LAB
PATH REPORT.COMMENTS IMP SPEC: NORMAL
PATH REPORT.FINAL DX SPEC: NORMAL
PATH REPORT.GROSS SPEC: NORMAL
PATH REPORT.MICROSCOPIC SPEC OTHER STN: NORMAL
PATH REPORT.RELEVANT HX SPEC: NORMAL
PHOTO IMAGE: NORMAL
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
## 2025-05-12 NOTE — RESULT ENCOUNTER NOTE
Please call patient with the following message    Biopsy on the scalp showed cells and calcifications consistent with long term retained foreign body and inflammatory reaction. No concerning features. Next step if wanted to try to get the mass smaller would be to see if dermatology will cut it out.     Konstantin Medina PA-C

## 2025-05-13 NOTE — TELEPHONE ENCOUNTER
RN Triage    Patient Contact    Attempt # 1    Was call answered?  No.  Left message on voicemail with information to call me back.    Upon call back please give results.     Timi Landis RN  [facility] [place]  May 13, 2025

## 2025-05-14 NOTE — TELEPHONE ENCOUNTER
RN Triage    Patient Contact    Attempt # 1    RN did attempt to reach patient. No answer. Message left for patient to call the clinic back and ask to speak to a member of the care team. Wanting to review message from provider.      Vivien Ann RN on 5/14/2025 at 12:21 PM       Caitlin Medina PA-C  5/12/2025  4:21 PM CDT Back to Top      Please call patient with the following message     Biopsy on the scalp showed cells and calcifications consistent with long term retained foreign body and inflammatory reaction. No concerning features. Next step if wanted to try to get the mass smaller would be to see if dermatology will cut it out.      Konstantin Medina PA-C      patient

## 2025-05-15 NOTE — TELEPHONE ENCOUNTER
Two call attempts have been made to reach patient with no call back. Please mail result letter.     Keturah KOENIGN, RN

## (undated) DEVICE — DRAPE C-ARM MINI 5423

## (undated) DEVICE — SU ETHILON 3-0 PS-2 18" 1669H

## (undated) DEVICE — SYR EAR BULB 2OZ

## (undated) DEVICE — TUBING SUCTION 12"X1/4" N612

## (undated) DEVICE — DRAPE U SPLIT 74X120" 29440

## (undated) DEVICE — BNDG ESMARK 4" STERILE

## (undated) DEVICE — DRSG XEROFORM 1X8"

## (undated) DEVICE — GLOVE PROTEXIS W/NEU-THERA 7.5  2D73TE75

## (undated) DEVICE — PACK HAND WRIST FOREARM CUSTOM

## (undated) DEVICE — GLOVE ESTEEM BLUE W/NEU-THERA 8.0  2D73PB80

## (undated) DEVICE — BLADE KNIFE SURG 15 371115

## (undated) DEVICE — SOL ISOPROPYL ALCOHOL USP 70% 16OZ  NDC10565-002-16 D0022

## (undated) DEVICE — DRSG GAUZE 4X4" TRAY

## (undated) RX ORDER — FENTANYL CITRATE 50 UG/ML
INJECTION, SOLUTION INTRAMUSCULAR; INTRAVENOUS
Status: DISPENSED
Start: 2018-12-24

## (undated) RX ORDER — ONDANSETRON 2 MG/ML
INJECTION INTRAMUSCULAR; INTRAVENOUS
Status: DISPENSED
Start: 2018-12-24

## (undated) RX ORDER — DEXAMETHASONE SODIUM PHOSPHATE 10 MG/ML
INJECTION INTRAMUSCULAR; INTRAVENOUS
Status: DISPENSED
Start: 2018-12-24

## (undated) RX ORDER — KETOROLAC TROMETHAMINE 30 MG/ML
INJECTION, SOLUTION INTRAMUSCULAR; INTRAVENOUS
Status: DISPENSED
Start: 2018-12-24